# Patient Record
Sex: MALE | Race: WHITE | Employment: OTHER | URBAN - METROPOLITAN AREA
[De-identification: names, ages, dates, MRNs, and addresses within clinical notes are randomized per-mention and may not be internally consistent; named-entity substitution may affect disease eponyms.]

---

## 2017-06-12 PROBLEM — L03.115 CELLULITIS OF RIGHT LOWER EXTREMITY: Status: ACTIVE | Noted: 2017-06-12

## 2017-06-12 PROBLEM — I10 ESSENTIAL HYPERTENSION, BENIGN: Chronic | Status: ACTIVE | Noted: 2017-06-12

## 2017-06-12 PROBLEM — N18.30 CHRONIC KIDNEY DISEASE, STAGE III (MODERATE) (HCC): Status: ACTIVE | Noted: 2017-06-12

## 2017-06-12 PROBLEM — I87.2 VENOUS STASIS DERMATITIS OF BOTH LOWER EXTREMITIES: Chronic | Status: ACTIVE | Noted: 2017-06-12

## 2018-03-27 ENCOUNTER — OFFICE VISIT (OUTPATIENT)
Dept: CARDIOLOGY CLINIC | Age: 83
End: 2018-03-27
Payer: MEDICARE

## 2018-03-27 VITALS
BODY MASS INDEX: 29.77 KG/M2 | HEIGHT: 69 IN | WEIGHT: 201 LBS | SYSTOLIC BLOOD PRESSURE: 152 MMHG | DIASTOLIC BLOOD PRESSURE: 80 MMHG | HEART RATE: 48 BPM | RESPIRATION RATE: 16 BRPM

## 2018-03-27 DIAGNOSIS — R00.1 SINUS BRADYCARDIA: ICD-10-CM

## 2018-03-27 DIAGNOSIS — R06.02 SOB (SHORTNESS OF BREATH): Primary | ICD-10-CM

## 2018-03-27 PROCEDURE — 93000 ELECTROCARDIOGRAM COMPLETE: CPT | Performed by: INTERNAL MEDICINE

## 2018-03-27 PROCEDURE — 99205 OFFICE O/P NEW HI 60 MIN: CPT | Performed by: INTERNAL MEDICINE

## 2018-03-27 NOTE — PROGRESS NOTES
and oriented to person, place, and time. Skin: Skin is warm and dry. No rash noted. Not diaphoretic. No erythema. Psychiatric: Normal mood and affect. Behavior is normal.     EKG:  normal sinus rhythm, non conducted PAC's noted, there are no previous tracings available for comparison. ASSESSMENT AND PLAN:  Patient Active Problem List   Diagnosis    Essential hypertension, benign    Chronic kidney disease, stage III (moderate)    Venous stasis dermatitis of both lower extremities    Sinus bradycardia     Patient asymptomatic at good functioanl capacity for age, no symps referrable to bradcardia.   Non conducted PACs noted  Has prominent murmur  No pacer needed  Will echo       Jeet Gaviria M.D  [facility]

## 2018-03-29 ENCOUNTER — HOSPITAL ENCOUNTER (OUTPATIENT)
Dept: CARDIOLOGY | Age: 83
Discharge: HOME | End: 2018-03-29
Payer: MEDICARE

## 2018-03-29 DIAGNOSIS — R00.1 SINUS BRADYCARDIA: ICD-10-CM

## 2018-03-29 DIAGNOSIS — R06.02 SOB (SHORTNESS OF BREATH): ICD-10-CM

## 2018-03-29 LAB
LV EF: 55 %
LVEF MODALITY: NORMAL

## 2018-03-29 PROCEDURE — 93306 TTE W/DOPPLER COMPLETE: CPT

## 2018-04-02 ENCOUNTER — TELEPHONE (OUTPATIENT)
Dept: CARDIOLOGY CLINIC | Age: 83
End: 2018-04-02

## 2018-08-06 ENCOUNTER — HOSPITAL ENCOUNTER (INPATIENT)
Age: 83
LOS: 4 days | Discharge: HOME | DRG: 603 | End: 2018-08-10
Attending: INTERNAL MEDICINE | Admitting: INTERNAL MEDICINE
Payer: MEDICARE

## 2018-08-06 PROBLEM — A41.9 SEPSIS (HCC): Status: ACTIVE | Noted: 2018-08-06

## 2018-08-06 PROBLEM — L03.90 CELLULITIS: Status: ACTIVE | Noted: 2018-08-06

## 2018-08-06 LAB
ALBUMIN SERPL-MCNC: 3.5 G/DL (ref 3.5–5.2)
ALP BLD-CCNC: 160 U/L (ref 40–129)
ALT SERPL-CCNC: 28 U/L (ref 0–40)
ANION GAP SERPL CALCULATED.3IONS-SCNC: 14 MMOL/L (ref 7–16)
ANTISTREPTOLYSIN-O: 179 IU/ML (ref 0–200)
AST SERPL-CCNC: 36 U/L (ref 0–39)
BASOPHILS ABSOLUTE: 0.02 E9/L (ref 0–0.2)
BASOPHILS RELATIVE PERCENT: 0.2 % (ref 0–2)
BILIRUB SERPL-MCNC: 0.5 MG/DL (ref 0–1.2)
BUN BLDV-MCNC: 75 MG/DL (ref 8–23)
C-REACTIVE PROTEIN: 14.9 MG/DL (ref 0–0.4)
CALCIUM SERPL-MCNC: 8.9 MG/DL (ref 8.6–10.2)
CHLORIDE BLD-SCNC: 100 MMOL/L (ref 98–107)
CO2: 18 MMOL/L (ref 22–29)
CREAT SERPL-MCNC: 2.8 MG/DL (ref 0.7–1.2)
EOSINOPHILS ABSOLUTE: 0.23 E9/L (ref 0.05–0.5)
EOSINOPHILS RELATIVE PERCENT: 1.9 % (ref 0–6)
GFR AFRICAN AMERICAN: 26
GFR NON-AFRICAN AMERICAN: 21 ML/MIN/1.73
GLUCOSE BLD-MCNC: 132 MG/DL (ref 74–109)
HCT VFR BLD CALC: 34 % (ref 37–54)
HEMOGLOBIN: 11.4 G/DL (ref 12.5–16.5)
IMMATURE GRANULOCYTES #: 0.05 E9/L
IMMATURE GRANULOCYTES %: 0.4 % (ref 0–5)
LYMPHOCYTES ABSOLUTE: 0.98 E9/L (ref 1.5–4)
LYMPHOCYTES RELATIVE PERCENT: 8.2 % (ref 20–42)
MCH RBC QN AUTO: 30.6 PG (ref 26–35)
MCHC RBC AUTO-ENTMCNC: 33.5 % (ref 32–34.5)
MCV RBC AUTO: 91.4 FL (ref 80–99.9)
MONOCYTES ABSOLUTE: 1.26 E9/L (ref 0.1–0.95)
MONOCYTES RELATIVE PERCENT: 10.6 % (ref 2–12)
NEUTROPHILS ABSOLUTE: 9.36 E9/L (ref 1.8–7.3)
NEUTROPHILS RELATIVE PERCENT: 78.7 % (ref 43–80)
PDW BLD-RTO: 13.8 FL (ref 11.5–15)
PLATELET # BLD: 180 E9/L (ref 130–450)
PMV BLD AUTO: 11 FL (ref 7–12)
POTASSIUM SERPL-SCNC: 5 MMOL/L (ref 3.5–5)
RBC # BLD: 3.72 E12/L (ref 3.8–5.8)
SEDIMENTATION RATE, ERYTHROCYTE: 75 MM/HR (ref 0–15)
SODIUM BLD-SCNC: 132 MMOL/L (ref 132–146)
TOTAL PROTEIN: 6.9 G/DL (ref 6.4–8.3)
WBC # BLD: 11.9 E9/L (ref 4.5–11.5)

## 2018-08-06 PROCEDURE — 6360000002 HC RX W HCPCS: Performed by: INTERNAL MEDICINE

## 2018-08-06 PROCEDURE — 86140 C-REACTIVE PROTEIN: CPT

## 2018-08-06 PROCEDURE — 6370000000 HC RX 637 (ALT 250 FOR IP): Performed by: INTERNAL MEDICINE

## 2018-08-06 PROCEDURE — 86060 ANTISTREPTOLYSIN O TITER: CPT

## 2018-08-06 PROCEDURE — 2580000003 HC RX 258: Performed by: INTERNAL MEDICINE

## 2018-08-06 PROCEDURE — 85025 COMPLETE CBC W/AUTO DIFF WBC: CPT

## 2018-08-06 PROCEDURE — 2060000000 HC ICU INTERMEDIATE R&B

## 2018-08-06 PROCEDURE — 87040 BLOOD CULTURE FOR BACTERIA: CPT

## 2018-08-06 PROCEDURE — 85651 RBC SED RATE NONAUTOMATED: CPT

## 2018-08-06 PROCEDURE — 6360000002 HC RX W HCPCS: Performed by: SPECIALIST

## 2018-08-06 PROCEDURE — 36415 COLL VENOUS BLD VENIPUNCTURE: CPT

## 2018-08-06 PROCEDURE — 80053 COMPREHEN METABOLIC PANEL: CPT

## 2018-08-06 RX ORDER — HYDRALAZINE HYDROCHLORIDE 25 MG/1
25 TABLET, FILM COATED ORAL EVERY 8 HOURS SCHEDULED
Status: DISCONTINUED | OUTPATIENT
Start: 2018-08-06 | End: 2018-08-06 | Stop reason: SDUPTHER

## 2018-08-06 RX ORDER — HYDRALAZINE HYDROCHLORIDE 25 MG/1
25 TABLET, FILM COATED ORAL EVERY 8 HOURS SCHEDULED
Status: DISCONTINUED | OUTPATIENT
Start: 2018-08-06 | End: 2018-08-07

## 2018-08-06 RX ORDER — AMLODIPINE BESYLATE 2.5 MG/1
2.5 TABLET ORAL DAILY
Status: DISCONTINUED | OUTPATIENT
Start: 2018-08-06 | End: 2018-08-09

## 2018-08-06 RX ORDER — LISINOPRIL 20 MG/1
40 TABLET ORAL DAILY
Status: DISCONTINUED | OUTPATIENT
Start: 2018-08-07 | End: 2018-08-07

## 2018-08-06 RX ORDER — AMLODIPINE BESYLATE 2.5 MG/1
2.5 TABLET ORAL DAILY
Status: DISCONTINUED | OUTPATIENT
Start: 2018-08-06 | End: 2018-08-06 | Stop reason: SDUPTHER

## 2018-08-06 RX ORDER — CHOLECALCIFEROL (VITAMIN D3) 50 MCG
2000 TABLET ORAL DAILY
Status: DISCONTINUED | OUTPATIENT
Start: 2018-08-07 | End: 2018-08-10 | Stop reason: HOSPADM

## 2018-08-06 RX ORDER — ASPIRIN 81 MG/1
81 TABLET, CHEWABLE ORAL DAILY
Status: DISCONTINUED | OUTPATIENT
Start: 2018-08-06 | End: 2018-08-10 | Stop reason: HOSPADM

## 2018-08-06 RX ORDER — SODIUM CHLORIDE 0.9 % (FLUSH) 0.9 %
10 SYRINGE (ML) INJECTION PRN
Status: DISCONTINUED | OUTPATIENT
Start: 2018-08-06 | End: 2018-08-10 | Stop reason: HOSPADM

## 2018-08-06 RX ORDER — ONDANSETRON 2 MG/ML
4 INJECTION INTRAMUSCULAR; INTRAVENOUS EVERY 6 HOURS PRN
Status: DISCONTINUED | OUTPATIENT
Start: 2018-08-06 | End: 2018-08-10 | Stop reason: HOSPADM

## 2018-08-06 RX ORDER — HYDRALAZINE HYDROCHLORIDE 25 MG/1
25 TABLET, FILM COATED ORAL 3 TIMES DAILY
Status: ON HOLD | COMMUNITY
End: 2018-08-10 | Stop reason: HOSPADM

## 2018-08-06 RX ORDER — SODIUM CHLORIDE 0.9 % (FLUSH) 0.9 %
10 SYRINGE (ML) INJECTION EVERY 12 HOURS SCHEDULED
Status: DISCONTINUED | OUTPATIENT
Start: 2018-08-06 | End: 2018-08-10 | Stop reason: HOSPADM

## 2018-08-06 RX ADMIN — Medication 2 G: at 20:54

## 2018-08-06 RX ADMIN — Medication 10 ML: at 20:55

## 2018-08-06 RX ADMIN — ENOXAPARIN SODIUM 30 MG: 100 INJECTION SUBCUTANEOUS at 22:30

## 2018-08-06 RX ADMIN — HYDRALAZINE HYDROCHLORIDE 25 MG: 25 TABLET ORAL at 20:55

## 2018-08-06 ASSESSMENT — PAIN SCALES - GENERAL
PAINLEVEL_OUTOF10: 0

## 2018-08-07 ENCOUNTER — APPOINTMENT (OUTPATIENT)
Dept: ULTRASOUND IMAGING | Age: 83
DRG: 603 | End: 2018-08-07
Attending: INTERNAL MEDICINE
Payer: MEDICARE

## 2018-08-07 LAB
BACTERIA: NORMAL /HPF
BILIRUBIN URINE: NEGATIVE
BLOOD, URINE: NEGATIVE
CALCIUM IONIZED: 1.18 MMOL/L (ref 1.15–1.33)
CHLORIDE URINE RANDOM: 34 MMOL/L
CLARITY: CLEAR
COLOR: YELLOW
EKG ATRIAL RATE: 22 BPM
EKG Q-T INTERVAL: 502 MS
EKG QRS DURATION: 104 MS
EKG QTC CALCULATION (BAZETT): 414 MS
EKG R AXIS: -23 DEGREES
EKG T AXIS: 15 DEGREES
EKG VENTRICULAR RATE: 41 BPM
GLUCOSE URINE: NEGATIVE MG/DL
KETONES, URINE: NEGATIVE MG/DL
LEUKOCYTE ESTERASE, URINE: NEGATIVE
NITRITE, URINE: NEGATIVE
PH UA: 5.5 (ref 5–9)
POTASSIUM, UR: 20.5 MMOL/L
PROTEIN UA: 30 MG/DL
RBC UA: NORMAL /HPF (ref 0–2)
SODIUM URINE: 48 MMOL/L
SPECIFIC GRAVITY UA: 1.02 (ref 1–1.03)
UREA NITROGEN, UR: 859 MG/DL (ref 800–1666)
UROBILINOGEN, URINE: 0.2 E.U./DL
WBC UA: NORMAL /HPF (ref 0–5)

## 2018-08-07 PROCEDURE — 82044 UR ALBUMIN SEMIQUANTITATIVE: CPT

## 2018-08-07 PROCEDURE — 6360000002 HC RX W HCPCS: Performed by: SPECIALIST

## 2018-08-07 PROCEDURE — 6370000000 HC RX 637 (ALT 250 FOR IP): Performed by: INTERNAL MEDICINE

## 2018-08-07 PROCEDURE — 36415 COLL VENOUS BLD VENIPUNCTURE: CPT

## 2018-08-07 PROCEDURE — 99223 1ST HOSP IP/OBS HIGH 75: CPT | Performed by: INTERNAL MEDICINE

## 2018-08-07 PROCEDURE — 97161 PT EVAL LOW COMPLEX 20 MIN: CPT

## 2018-08-07 PROCEDURE — 6360000002 HC RX W HCPCS: Performed by: INTERNAL MEDICINE

## 2018-08-07 PROCEDURE — 2060000000 HC ICU INTERMEDIATE R&B

## 2018-08-07 PROCEDURE — 82570 ASSAY OF URINE CREATININE: CPT

## 2018-08-07 PROCEDURE — 87070 CULTURE OTHR SPECIMN AEROBIC: CPT

## 2018-08-07 PROCEDURE — 84300 ASSAY OF URINE SODIUM: CPT

## 2018-08-07 PROCEDURE — 2580000003 HC RX 258: Performed by: INTERNAL MEDICINE

## 2018-08-07 PROCEDURE — 97165 OT EVAL LOW COMPLEX 30 MIN: CPT

## 2018-08-07 PROCEDURE — 87205 SMEAR GRAM STAIN: CPT

## 2018-08-07 PROCEDURE — 84540 ASSAY OF URINE/UREA-N: CPT

## 2018-08-07 PROCEDURE — 82436 ASSAY OF URINE CHLORIDE: CPT

## 2018-08-07 PROCEDURE — 84133 ASSAY OF URINE POTASSIUM: CPT

## 2018-08-07 PROCEDURE — 93005 ELECTROCARDIOGRAM TRACING: CPT | Performed by: NURSE PRACTITIONER

## 2018-08-07 PROCEDURE — 82330 ASSAY OF CALCIUM: CPT

## 2018-08-07 PROCEDURE — APPSS60 APP SPLIT SHARED TIME 46-60 MINUTES: Performed by: NURSE PRACTITIONER

## 2018-08-07 PROCEDURE — 76770 US EXAM ABDO BACK WALL COMP: CPT

## 2018-08-07 PROCEDURE — 81001 URINALYSIS AUTO W/SCOPE: CPT

## 2018-08-07 PROCEDURE — 2580000003 HC RX 258: Performed by: SPECIALIST

## 2018-08-07 PROCEDURE — 6370000000 HC RX 637 (ALT 250 FOR IP): Performed by: SPECIALIST

## 2018-08-07 PROCEDURE — 51798 US URINE CAPACITY MEASURE: CPT

## 2018-08-07 RX ORDER — CEPHALEXIN 500 MG/1
1000 CAPSULE ORAL EVERY 12 HOURS SCHEDULED
Status: DISCONTINUED | OUTPATIENT
Start: 2018-08-08 | End: 2018-08-10 | Stop reason: HOSPADM

## 2018-08-07 RX ORDER — SODIUM CHLORIDE 9 MG/ML
INJECTION, SOLUTION INTRAVENOUS CONTINUOUS
Status: DISCONTINUED | OUTPATIENT
Start: 2018-08-07 | End: 2018-08-08

## 2018-08-07 RX ORDER — HYDRALAZINE HYDROCHLORIDE 50 MG/1
50 TABLET, FILM COATED ORAL EVERY 8 HOURS SCHEDULED
Status: DISCONTINUED | OUTPATIENT
Start: 2018-08-07 | End: 2018-08-08

## 2018-08-07 RX ORDER — CLINDAMYCIN HYDROCHLORIDE 150 MG/1
300 CAPSULE ORAL EVERY 8 HOURS SCHEDULED
Status: DISCONTINUED | OUTPATIENT
Start: 2018-08-07 | End: 2018-08-10 | Stop reason: HOSPADM

## 2018-08-07 RX ADMIN — ENOXAPARIN SODIUM 30 MG: 100 INJECTION SUBCUTANEOUS at 17:33

## 2018-08-07 RX ADMIN — SODIUM CHLORIDE: 9 INJECTION, SOLUTION INTRAVENOUS at 06:32

## 2018-08-07 RX ADMIN — Medication 10 ML: at 08:57

## 2018-08-07 RX ADMIN — HYDRALAZINE HYDROCHLORIDE 25 MG: 25 TABLET ORAL at 14:00

## 2018-08-07 RX ADMIN — CLINDAMYCIN HYDROCHLORIDE 300 MG: 150 CAPSULE ORAL at 17:33

## 2018-08-07 RX ADMIN — HYDRALAZINE HYDROCHLORIDE 25 MG: 25 TABLET ORAL at 06:12

## 2018-08-07 RX ADMIN — CEFAZOLIN 2 G: 10 INJECTION, POWDER, FOR SOLUTION INTRAVENOUS; PARENTERAL at 17:27

## 2018-08-07 RX ADMIN — HYDRALAZINE HYDROCHLORIDE 50 MG: 50 TABLET, FILM COATED ORAL at 21:05

## 2018-08-07 RX ADMIN — AMLODIPINE BESYLATE 2.5 MG: 2.5 TABLET ORAL at 08:56

## 2018-08-07 RX ADMIN — CLINDAMYCIN HYDROCHLORIDE 300 MG: 150 CAPSULE ORAL at 21:05

## 2018-08-07 RX ADMIN — ASPIRIN 81 MG 81 MG: 81 TABLET ORAL at 08:56

## 2018-08-07 RX ADMIN — Medication 2000 UNITS: at 06:12

## 2018-08-07 ASSESSMENT — PAIN SCALES - GENERAL
PAINLEVEL_OUTOF10: 0
PAINLEVEL_OUTOF10: 0

## 2018-08-07 NOTE — CONSULTS
unroofed and cultures were sent. Lines: peripheral      CBC+dif:  Recent Labs      08/06/18 2035   WBC  11.9*   HGB  11.4*   HCT  34.0*   MCV  91.4   PLT  180   NEUTROABS  9.36*     Lab Results   Component Value Date    CRP 14.9 (H) 08/06/2018    CRP 5.9 (H) 06/11/2017      No results found for: CRPHS  Lab Results   Component Value Date    SEDRATE 75 (H) 08/06/2018    SEDRATE 27 (H) 06/13/2017     Lab Results   Component Value Date    ALT 28 08/06/2018    AST 36 08/06/2018    ALKPHOS 160 (H) 08/06/2018    BILITOT 0.5 08/06/2018     Lab Results   Component Value Date     08/06/2018    K 5.0 08/06/2018     08/06/2018    CO2 18 08/06/2018    BUN 75 08/06/2018    CREATININE 2.8 08/06/2018    GFRAA 26 08/06/2018    LABGLOM 21 08/06/2018    GLUCOSE 132 08/06/2018    PROT 6.9 08/06/2018    LABALBU 3.5 08/06/2018    CALCIUM 8.9 08/06/2018    BILITOT 0.5 08/06/2018    ALKPHOS 160 08/06/2018    AST 36 08/06/2018    ALT 28 08/06/2018       Lab Results   Component Value Date    PROTIME 12.0 06/11/2017    INR 1.1 06/11/2017       No results found for: TSH    Lab Results   Component Value Date    COLORU Yellow 06/11/2017    PHUR 6.0 06/11/2017    WBCUA 1-3 06/11/2017    RBCUA NONE 06/11/2017    BACTERIA RARE 06/11/2017    CLARITYU Clear 06/11/2017    SPECGRAV 1.010 06/11/2017    LEUKOCYTESUR Negative 06/11/2017    UROBILINOGEN 0.2 06/11/2017    BILIRUBINUR Negative 06/11/2017    BLOODU Negative 06/11/2017    GLUCOSEU Negative 06/11/2017     Radiology:  Noted    Microbiology:  Pending  No results for input(s): BC in the last 72 hours. No results for input(s): ORG in the last 72 hours. No results for input(s): Jermaine Gonzalez in the last 72 hours. No results for input(s): STREPNEUMAGU in the last 72 hours. No results for input(s): LP1UAG in the last 72 hours. Recent Labs      08/06/18 2035   ASO  179     No results for input(s): CULTRESP in the last 72 hours.     Assessment:  · Cellulitis right leg  · Mild

## 2018-08-07 NOTE — H&P
consistent with extrasystoles. SKIN:  Reveals bright red erythema that is extending over his right  anterior lower leg from above his ankle to the knee. It is tender to touch  and it is hot to touch. Right leg has significantly increased swelling of  approximately 3+ swelling in his right leg with some serous drainage noted  which is minimal.  No evidence of any significant wound or ulcers of this  leg. His left lower extremity is much better, has these chronic stasis  changes and only 1+ swelling which is better than his usual leg swelling. HEENT:  Head:  Normocephalic, atraumatic. Eyes:  PERRLA. Extraocular  movements intact without nystagmus. Throat:  Oral and buccal mucosa moist  without oral ulcer or exudate. NECK:  No goiter, bruit, or lymphadenopathy. CHEST:  Symmetrical excursion with inspiration. BACK:  No CVA or spine tenderness. HEART:  Has a regular rate and rhythm with ectopy. No significant murmur,  rub, gallop, or JVD. LUNGS:  Clear to auscultation and percussion bilaterally. ABDOMEN:  Soft, nontender. Positive bowel sounds in all four quadrants. No hepatosplenomegaly. EXTREMITIES:  As above. NEUROLOGIC:  Cranial nerves II-XII grossly intact. He has grossly normal  sensory exam.  He has 5/5 strength throughout. His gait is normal.    ASSESSMENT/PLAN:  1. Cellulitis of the right lower extremity. This has developed over the  last four or five days. The patient does have a substrate of chronic leg  swelling with chronic stasis dermatitis and scaliness over both lower  extremities, and certainly would serve as a portal of entry. The right leg  swelling is much worse than the left and presumably due to the cellulitis. We will start him on some IV antibiotics per Infectious Disease, which has  been consulted. 2.  Hypertension. The patient has recently had some fluctuations in his  blood pressure medicines due to bradycardia which prompted the  discontinuation of his carvedilol. His hydrochlorothiazide was  discontinued due to a significant rise in his creatinine. He is on a  lowered dose of amlodipine due to chronic leg swelling. He is on low dose  of hydralazine as well as lisinopril 40 mg daily. These will be resumed,  and we will continue to watch his blood pressure closely. 3.  Chronic kidney disease stage IV. The patient's recent creatinine had  jumped up from his baseline around 1.8 to 2.0 to 2.4 with an increase in  his BUN. This prompted the discontinuation of his hydrochlorothiazide. We  will recheck labs and see how his kidney function is doing off of the  hydrochlorothiazide. 4.  Chronic venous insufficiency. The patient should wear his compression  hose chronically, but he just does not. His left leg has about 1+ leg  swelling with his usual chronic stasis changes, and this one leg actually  looks improved from his baseline. The right leg swelling is likely due to  his acute cellulitis. 5.  Dementia. The patient has a baseline of some mild-to-moderate  dementia. He is very pleasant with no behavioral changes, and the patient  and his family have declined medication in the past, and we will not start  any medications at this time per their request.    His admitting lab work including CBC, CMP, and blood cultures have not yet  been drawn, and are therefore presently pending at this time. Infectious  Disease has been consulted.         Maryann Olivas MD    D: 08/06/2018 18:27:51       T: 08/06/2018 18:31:41     [email or web address]  Job#: [id number]     Doc#: [id number]    CC:

## 2018-08-07 NOTE — CARE COORDINATION
CM met with pt & wife in room to discuss role, anticipated LOS & current plan of care. Wife states pt sent into hospital for IV ATB but he only received a dose last pm. Discussed pended cultures, ID consult & transition of care. Denies ever having home care but prefers to return home. Wife & pt state they get around without difficulty in their home, shop together & prepare meals. Decline any home needs at this time. She doesn't feel as if she could manage IV ATB at home should they be needed.

## 2018-08-07 NOTE — PROGRESS NOTES
states     Patient and or family understand(s) diagnosis, prognosis, and plan of care. -  Yes     PLAN  PT care will be provided in accordance with the objectives noted above. Whenever appropriate, clear delegation orders will be provided for nursing staff. Exercises and functional mobility practice will be used as well as appropriate assistive devices or modalities to obtain goals. Patient and family education will also be administered as needed. Frequency of treatments will be 2-3 x/week x  5 days.     Time in:  0958   Time out:  258 [place] number:  [id number]

## 2018-08-07 NOTE — PROGRESS NOTES
Subjective: The patient is awake and alert. No problems overnight. Denies chest pain, angina, and dyspnea. Denies abdominal pain. Tolerating diet. No nausea or vomiting. Started on cefazolin for his RLE cellulitis. His SBP is in 170's. His creat is increased to 2.8, BUN 75, K+ 5.0. Recent manipulations in BP meds included increase in lisinopril from 10-->40 mg daily, cessation of HCTZ due to elevated creatinine, cessation of carvedilol due to bradycardia, and addition of hydralazine. Hasn't tolerated larger doses of amlodipine due to increased leg swelling. (he has chronic leg swelling due to venous insuff). Despite stopping his carvedilol last week, his HR remains in 40's (sinus). Objective:    BP (!) 172/64   Pulse 50   Temp 97.2 °F (36.2 °C) (Oral)   Resp 18   Ht 5' 9\" (1.753 m)   Wt 202 lb 14.4 oz (92 kg)   SpO2 98%   BMI 29.96 kg/m²   Neck: No goiter, bruit, or LA  Heart:  Regular, thuan, no murmurs, gallops, or rubs. Lungs:  CTA bilaterally, no wheeze, rales or rhonchi  Abd: bowel sounds present, nontender, nondistended, no masses  Extrem:  No clubbing, cyanosis,, 2+ peripheral pulses, FROM. 1+ left lower leg swelling with chronic stasis changes (overall improved).  2-3+ right lower leg swelling with bright erythema and warmth    CBC:   Lab Results   Component Value Date    WBC 11.9 08/06/2018    RBC 3.72 08/06/2018    HGB 11.4 08/06/2018    HCT 34.0 08/06/2018    MCV 91.4 08/06/2018    MCH 30.6 08/06/2018    MCHC 33.5 08/06/2018    RDW 13.8 08/06/2018     08/06/2018    MPV 11.0 08/06/2018     CMP:    Lab Results   Component Value Date     08/06/2018    K 5.0 08/06/2018     08/06/2018    CO2 18 08/06/2018    BUN 75 08/06/2018    CREATININE 2.8 08/06/2018    GFRAA 26 08/06/2018    LABGLOM 21 08/06/2018    GLUCOSE 132 08/06/2018    PROT 6.9 08/06/2018    LABALBU 3.5 08/06/2018    CALCIUM 8.9 08/06/2018    BILITOT 0.5 08/06/2018    ALKPHOS 160 08/06/2018    AST 36

## 2018-08-07 NOTE — PLAN OF CARE
Problem: Falls - Risk of:  Goal: Will remain free from falls  Will remain free from falls   Outcome: Met This Shift      Problem: Skin Integrity/Risk  Goal: No skin breakdown during hospitalization  Outcome: Met This Shift      Problem: Pain:  Goal: Pain level will decrease  Pain level will decrease   Outcome: Met This Shift      Problem: Mobility - Impaired:  Goal: Mobility will improve  Mobility will improve   Outcome: Ongoing

## 2018-08-07 NOTE — CONSULTS
due to patient's advanced age        REVIEW OF SYSTEMS:     · Constitutional: Denies fevers,  night sweats, and fatigue + for chills   · HEENT: Denies headaches, nose bleeds, and blurred vision,oral pain, abscess or lesion. · Musculoskeletal: Denies , pain to BLE with ambulation and + for  edema to BLE chronic in nature. + for mechanical fall   · Neurological: Denies dizziness and lightheadedness, numbness and tingling  · Cardiovascular: Denies chest pain, palpitations, and feelings of heart racing. · Respiratory: Denies orthopnea and PND  · Gastrointestinal: Denies heartburn, nausea/vomiting, diarrhea and constipation, black/bloody, and tarry stools. · Genitourinary: Denies dysuria and hematuria  · Hematologic: Denies excessive bruising or bleeding  · Lymphatic: Denies lumps and bumps to neck, axilla, breast, and groin  · Endocrine: Denies excessive thirst. Denies intolerance to hot and cold. · Psychiatric: Denies anxiety and depression. PHYSICAL EXAM:   BP (!) 172/64   Pulse 50   Temp 97.2 °F (36.2 °C) (Oral)   Resp 18   Ht 5' 9\" (1.753 m)   Wt 202 lb 14.4 oz (92 kg)   SpO2 98%   BMI 29.96 kg/m²   CONST:  Well developed,  male, hard of hearing, examined at bedside with wife present,  well nourished who appears stated age. Awake, alert, cooperative, no apparent distress  HEENT:   Head- Normocephalic, atraumatic   Eyes- Conjunctivae pink, anicteric  Throat- Oral mucosa pink and moist  Neck-  No stridor, trachea midline, no jugular venous distention. No adenopathy   CHEST: Chest symmetrical and non-tender to palpation. No accessory muscle use or intercostal retractions  RESPIRATORY: Lung sounds - clear throughout fields   CARDIOVASCULAR:     No carotid bruit  Heart Inspection- shows no noted pulsations  Heart Palpation- no heaves or thrills; PMI is non-displaced   Heart Ausculation- Regular rate and rhythm, 1/6 systolic murmur.  No s3, s4 or rub   PV: + 2  right lower extremity edema trace

## 2018-08-08 LAB
ALBUMIN SERPL-MCNC: 2.8 G/DL (ref 3.5–5.2)
ALP BLD-CCNC: 83 U/L (ref 40–129)
ALT SERPL-CCNC: 13 U/L (ref 0–40)
ANION GAP SERPL CALCULATED.3IONS-SCNC: 12 MMOL/L (ref 7–16)
AST SERPL-CCNC: 17 U/L (ref 0–39)
BASOPHILS ABSOLUTE: 0.02 E9/L (ref 0–0.2)
BASOPHILS RELATIVE PERCENT: 0.3 % (ref 0–2)
BILIRUB SERPL-MCNC: 0.4 MG/DL (ref 0–1.2)
BUN BLDV-MCNC: 62 MG/DL (ref 8–23)
CALCIUM IONIZED: 0.95 MMOL/L (ref 1.15–1.33)
CALCIUM SERPL-MCNC: 8.2 MG/DL (ref 8.6–10.2)
CHLORIDE BLD-SCNC: 106 MMOL/L (ref 98–107)
CO2: 15 MMOL/L (ref 22–29)
CREAT SERPL-MCNC: 2.3 MG/DL (ref 0.7–1.2)
CREATININE URINE: 76 MG/DL (ref 40–278)
EOSINOPHILS ABSOLUTE: 0.31 E9/L (ref 0.05–0.5)
EOSINOPHILS RELATIVE PERCENT: 3.9 % (ref 0–6)
FERRITIN: 492 NG/ML
FOLATE: >20 NG/ML (ref 4.8–24.2)
GFR AFRICAN AMERICAN: 32
GFR NON-AFRICAN AMERICAN: 27 ML/MIN/1.73
GLUCOSE BLD-MCNC: 124 MG/DL (ref 74–109)
GRAM STAIN ORDERABLE: NORMAL
HCT VFR BLD CALC: 31 % (ref 37–54)
HEMOGLOBIN: 10.3 G/DL (ref 12.5–16.5)
IMMATURE GRANULOCYTES #: 0.14 E9/L
IMMATURE GRANULOCYTES %: 1.8 % (ref 0–5)
IRON SATURATION: 20 % (ref 20–55)
IRON: 33 MCG/DL (ref 59–158)
LYMPHOCYTES ABSOLUTE: 1.06 E9/L (ref 1.5–4)
LYMPHOCYTES RELATIVE PERCENT: 13.4 % (ref 20–42)
MAGNESIUM: 2.4 MG/DL (ref 1.6–2.6)
MCH RBC QN AUTO: 30.2 PG (ref 26–35)
MCHC RBC AUTO-ENTMCNC: 33.2 % (ref 32–34.5)
MCV RBC AUTO: 90.9 FL (ref 80–99.9)
MICROALBUMIN UR-MCNC: 366 MG/L
MICROALBUMIN/CREAT UR-RTO: 481.6 (ref 0–30)
MONOCYTES ABSOLUTE: 1.11 E9/L (ref 0.1–0.95)
MONOCYTES RELATIVE PERCENT: 14 % (ref 2–12)
NEUTROPHILS ABSOLUTE: 5.3 E9/L (ref 1.8–7.3)
NEUTROPHILS RELATIVE PERCENT: 66.6 % (ref 43–80)
PARATHYROID HORMONE INTACT: 61 PG/ML (ref 15–65)
PDW BLD-RTO: 13.9 FL (ref 11.5–15)
PHOSPHORUS: 3.3 MG/DL (ref 2.5–4.5)
PLATELET # BLD: 188 E9/L (ref 130–450)
PMV BLD AUTO: 10.7 FL (ref 7–12)
POTASSIUM SERPL-SCNC: 4.4 MMOL/L (ref 3.5–5)
RBC # BLD: 3.41 E12/L (ref 3.8–5.8)
SODIUM BLD-SCNC: 133 MMOL/L (ref 132–146)
TOTAL IRON BINDING CAPACITY: 163 MCG/DL (ref 250–450)
TOTAL PROTEIN: 6 G/DL (ref 6.4–8.3)
VITAMIN B-12: 814 PG/ML (ref 211–946)
VITAMIN D 25-HYDROXY: 37 NG/ML (ref 30–100)
WBC # BLD: 7.9 E9/L (ref 4.5–11.5)

## 2018-08-08 PROCEDURE — 80053 COMPREHEN METABOLIC PANEL: CPT

## 2018-08-08 PROCEDURE — 83970 ASSAY OF PARATHORMONE: CPT

## 2018-08-08 PROCEDURE — 84100 ASSAY OF PHOSPHORUS: CPT

## 2018-08-08 PROCEDURE — 6360000002 HC RX W HCPCS: Performed by: INTERNAL MEDICINE

## 2018-08-08 PROCEDURE — 82306 VITAMIN D 25 HYDROXY: CPT

## 2018-08-08 PROCEDURE — 6370000000 HC RX 637 (ALT 250 FOR IP): Performed by: INTERNAL MEDICINE

## 2018-08-08 PROCEDURE — 2500000003 HC RX 250 WO HCPCS: Performed by: INTERNAL MEDICINE

## 2018-08-08 PROCEDURE — 82607 VITAMIN B-12: CPT

## 2018-08-08 PROCEDURE — 6370000000 HC RX 637 (ALT 250 FOR IP): Performed by: SPECIALIST

## 2018-08-08 PROCEDURE — 83550 IRON BINDING TEST: CPT

## 2018-08-08 PROCEDURE — 2580000003 HC RX 258: Performed by: INTERNAL MEDICINE

## 2018-08-08 PROCEDURE — 82330 ASSAY OF CALCIUM: CPT

## 2018-08-08 PROCEDURE — 83735 ASSAY OF MAGNESIUM: CPT

## 2018-08-08 PROCEDURE — 83540 ASSAY OF IRON: CPT

## 2018-08-08 PROCEDURE — 85025 COMPLETE CBC W/AUTO DIFF WBC: CPT

## 2018-08-08 PROCEDURE — 36415 COLL VENOUS BLD VENIPUNCTURE: CPT

## 2018-08-08 PROCEDURE — 2060000000 HC ICU INTERMEDIATE R&B

## 2018-08-08 PROCEDURE — 82728 ASSAY OF FERRITIN: CPT

## 2018-08-08 PROCEDURE — 82746 ASSAY OF FOLIC ACID SERUM: CPT

## 2018-08-08 RX ADMIN — CALCIUM GLUCONATE 2 G: 98 INJECTION, SOLUTION INTRAVENOUS at 18:11

## 2018-08-08 RX ADMIN — Medication 2000 UNITS: at 06:17

## 2018-08-08 RX ADMIN — SODIUM BICARBONATE: 84 INJECTION, SOLUTION INTRAVENOUS at 18:11

## 2018-08-08 RX ADMIN — HYDRALAZINE HYDROCHLORIDE 50 MG: 50 TABLET, FILM COATED ORAL at 13:50

## 2018-08-08 RX ADMIN — CLINDAMYCIN HYDROCHLORIDE 300 MG: 150 CAPSULE ORAL at 20:41

## 2018-08-08 RX ADMIN — ENOXAPARIN SODIUM 30 MG: 100 INJECTION SUBCUTANEOUS at 18:52

## 2018-08-08 RX ADMIN — SODIUM CHLORIDE: 9 INJECTION, SOLUTION INTRAVENOUS at 12:35

## 2018-08-08 RX ADMIN — SODIUM CHLORIDE: 9 INJECTION, SOLUTION INTRAVENOUS at 03:26

## 2018-08-08 RX ADMIN — ASPIRIN 81 MG 81 MG: 81 TABLET ORAL at 08:50

## 2018-08-08 RX ADMIN — HYDRALAZINE HYDROCHLORIDE 75 MG: 50 TABLET ORAL at 20:41

## 2018-08-08 RX ADMIN — CEPHALEXIN 1000 MG: 500 CAPSULE ORAL at 20:41

## 2018-08-08 RX ADMIN — CLINDAMYCIN HYDROCHLORIDE 300 MG: 150 CAPSULE ORAL at 06:16

## 2018-08-08 RX ADMIN — AMLODIPINE BESYLATE 2.5 MG: 2.5 TABLET ORAL at 08:50

## 2018-08-08 RX ADMIN — HYDRALAZINE HYDROCHLORIDE 50 MG: 50 TABLET, FILM COATED ORAL at 06:16

## 2018-08-08 RX ADMIN — CLINDAMYCIN HYDROCHLORIDE 300 MG: 150 CAPSULE ORAL at 13:50

## 2018-08-08 RX ADMIN — CEPHALEXIN 1000 MG: 500 CAPSULE ORAL at 08:50

## 2018-08-08 ASSESSMENT — PAIN SCALES - GENERAL
PAINLEVEL_OUTOF10: 0
PAINLEVEL_OUTOF10: 0

## 2018-08-08 NOTE — PROGRESS NOTES
Nephrology  Note  Patient's Name: Robert Mitchell  5:11 PM  8/8/2018    Nephrologist: Jeanna Vázquez    Reason for Consult:  Stage II IRWIN  Requesting Physician:  Savage Kam MD    Chief Complaint:  Leg Pain    History Obtained From:  patient, spouse/SO and past medical records    History of Present Ilness:    Robert Mitchell is a 80 y.o. male with prior history of CKD G3B with a baseline serum cr 1.6mg/dl with a e-GFR=-41ml/min. He states he has been having more problems walking and had leg weakness. He saw his primary care doctor on 7/25 and the HCTZ was stopped and hydralazine was initiated. He had edema and saw primary doctor 8/2 for edema and he had an US (-) for DVT. He also had a fall and the EMS had to get him up. He developed erythema of the RLE and went back to the primary care MD and was sent to the ER. Past Medical History:   Diagnosis Date    Arthritis     Hypertension        Past Surgical History:   Procedure Laterality Date    HERNIA REPAIR      JOINT REPLACEMENT      JOINT REPLACEMENT      right knee       No family history on file. reports that he has never smoked. He has never used smokeless tobacco. He reports that he drinks about 1.8 oz of alcohol per week . He reports that he does not use drugs. Allergies:  Patient has no known allergies.     Current Medications:      0.9 % sodium chloride infusion Continuous   hydrALAZINE (APRESOLINE) tablet 50 mg 3 times per day   cephALEXin (KEFLEX) capsule 1,000 mg 2 times per day   clindamycin (CLEOCIN) capsule 300 mg 3 times per day   aspirin chewable tablet 81 mg Daily   amLODIPine (NORVASC) tablet 2.5 mg Daily   vitamin D tablet 2,000 Units Daily   sodium chloride flush 0.9 % injection 10 mL 2 times per day   sodium chloride flush 0.9 % injection 10 mL PRN   magnesium hydroxide (MILK OF MAGNESIA) 400 MG/5ML suspension 30 mL Daily PRN   ondansetron (ZOFRAN) injection 4 mg Q6H PRN   enoxaparin (LOVENOX) injection 30 mg Daily       Review of HGB 10.3 08/08/2018    HCT 31.0 08/08/2018     CMP:    Lab Results   Component Value Date     08/08/2018    K 4.4 08/08/2018     08/08/2018    CO2 15 08/08/2018    BUN 62 08/08/2018    CREATININE 2.3 08/08/2018    GFRAA 32 08/08/2018    LABGLOM 27 08/08/2018    GLUCOSE 124 08/08/2018    PROT 6.0 08/08/2018    LABALBU 2.8 08/08/2018    CALCIUM 8.2 08/08/2018    BILITOT 0.4 08/08/2018    ALKPHOS 83 08/08/2018    AST 17 08/08/2018    ALT 13 08/08/2018     BMP:    Lab Results   Component Value Date     08/08/2018    K 4.4 08/08/2018     08/08/2018    CO2 15 08/08/2018    BUN 62 08/08/2018    LABALBU 2.8 08/08/2018    CREATININE 2.3 08/08/2018    CALCIUM 8.2 08/08/2018    GFRAA 32 08/08/2018    LABGLOM 27 08/08/2018    GLUCOSE 124 08/08/2018     BUN/Creatinine:    Lab Results   Component Value Date    BUN 62 08/08/2018    CREATININE 2.3 08/08/2018     Hepatic Function Panel:    Lab Results   Component Value Date    ALKPHOS 83 08/08/2018    ALT 13 08/08/2018    AST 17 08/08/2018    PROT 6.0 08/08/2018    BILITOT 0.4 08/08/2018    LABALBU 2.8 08/08/2018     Albumin:    Lab Results   Component Value Date    LABALBU 2.8 08/08/2018     Calcium:    Lab Results   Component Value Date    CALCIUM 8.2 08/08/2018     Ionized Calcium:  No results found for: IONCA  Magnesium:    Lab Results   Component Value Date    MG 2.4 08/08/2018     Phosphorus:    Lab Results   Component Value Date    PHOS 3.3 08/08/2018     LDH:  No results found for: LDH  Uric Acid:  No results found for: LABURIC, URICACID  PT/INR:    Lab Results   Component Value Date    PROTIME 12.0 06/11/2017    INR 1.1 06/11/2017     PTT:  No results found for: APTT, PTT[APTT}  Troponin:    Lab Results   Component Value Date    TROPONINI <0.01 06/11/2017     U/A:    Lab Results   Component Value Date    COLORU Yellow 08/07/2018    PROTEINU 30 08/07/2018    PHUR 5.5 08/07/2018    WBCUA NONE 08/07/2018    RBCUA NONE 08/07/2018    BACTERIA NONE 2018    CLARITYU Clear 2018    SPECGRAV 1.020 2018    LEUKOCYTESUR Negative 2018    UROBILINOGEN 0.2 2018    BILIRUBINUR Negative 2018    BLOODU Negative 2018    GLUCOSEU Negative 2018     ABG:  No results found for: PH, PCO2, PO2, HCO3, BE, THGB, TCO2, O2SAT  HgBA1c:  No results found for: LABA1C  Microalbumen/Creatinine ratio:  No components found for: RUCREAT  FLP:  No results found for: TRIG, HDL, LDLCALC, LDLDIRECT, LABVLDL  VITAMIN B12: No components found for: B12  FOLATE:    Lab Results   Component Value Date    FOLATE >20.0 2018     Iron Saturation:  No components found for: PERCENTFE  FERRITIN:    Lab Results   Component Value Date    FERRITIN 492 2018     AMYLASE:  No results found for: AMYLASE  LIPASE:    Lab Results   Component Value Date    LIPASE 17 2017     24 Hour Urine for Protein:  No components found for: RAWUPRO, UHRS3, BVXN45MO, UTV3  24 Hour Urine for Creatinine Clearance:  No components found for: CREAT4, UHRS10, UTV10  PSA: No results found for: PSA     Imagin2018 4:15 PM       EXAM: US RETROPERITONEAL COMPLETE       NUMBER OF IMAGES:  41       INDICATION: Acute renal failure       Technique: Multiplanar grayscale ultrasonographic images were obtained   of the kidneys by the ultrasound technologist on 2018 4:15 PM.    Adjacent structures are also visualized. Selected static images were   presented to the radiologist for review.       Comparison: No prior studies available for comparison.       Findings: The kidneys demonstrate normal smooth contour. There is   slightly increased echogenicity of the renal parenchyma bilaterally   with minimal bilateral renal cortical thinning. The right kidney   measures approximately 9.5 cm x 4.7 cm x 4.4 cm and the left kidney   measures approximately 9.7 cm x 5.1 cm x 4.7 cm. No hydronephrosis,   perinephric collections, renal calculi or solid renal masses are   demonstrated.  The visualized urinary bladder is unremarkable.           Impression   1. No evidence of hydronephrosis, perinephric collections, renal   calculi or solid renal masses. 2. Slightly increased echogenicity of the renal parenchyma bilaterally   with minimal bilateral renal cortical thinning. Findings can be seen   in the setting of medical renal disease. Recommend clinical   correlation.          Assessment  1-Stage II IRWIN secondary to presumed combined effects of decreased effective renal perfusion due to the ACEI in the setting of the sepsis and cellulitis with  Inflammatory cytokines-renal us no hydro-urine electrolytes show FeNa 1.09% and UA with 30mg/dl protein with an   in the setting of the acute inflammatory-cr improving with the IVF-continue IVF    2-CKD G3B with a baseline serum cr 1.6mg/dl with a e-GFR=-41ml/min presumed sec to microvascular disease due to HTN and glomerular senescence of aging    3-Non Anion Gap Metabolic Acidosis in the setting of the IRWIN-HCO3 trending down in the setting of a Cl(-) expansion-add HCO3 to IVF    4-Anemia in CKD -HgB >11 no LYNDA indicated-check Ferritin 492 and iron sat 20%, B12 814, folate  >20    5-HTN with CKD I-IV with  Bradycardia-cardiology following-avoid (-) chronotropic meds-BP above goal <130/80- increase the hydralazine to 75mg tid and follow on the increased hydralazine and amlodipine    6- Cellulitis of the RLE-on cefazolin and Clinda-BC NG    7- hypocalcemia with Hypoalbuminemia in the setting of the Sec HPTH-PTH in goal 61 and Vit D 37-will treat ionized Ca++ with 2gr IV Ca++      Thank you  for allowing me to participate in care of [address] [place] [address] Dustin  5:11 PM  8/8/2018

## 2018-08-08 NOTE — PROGRESS NOTES
(L) 08/08/2018    MCV 90.9 08/08/2018     08/08/2018     Lab Results   Component Value Date    NEUTROABS 5.30 08/08/2018    NEUTROABS 9.36 (H) 08/06/2018    NEUTROABS 5.63 06/14/2017     Lab Results   Component Value Date    CRP 14.9 (H) 08/06/2018    CRP 5.9 (H) 06/11/2017     No results found for: CRPHS  Lab Results   Component Value Date    SEDRATE 75 (H) 08/06/2018    SEDRATE 27 (H) 06/13/2017     Lab Results   Component Value Date    ALT 13 08/08/2018    AST 17 08/08/2018    ALKPHOS 83 08/08/2018    BILITOT 0.4 08/08/2018     Lab Results   Component Value Date     08/08/2018    K 4.4 08/08/2018     08/08/2018    CO2 15 08/08/2018    BUN 62 08/08/2018    CREATININE 2.3 08/08/2018    CREATININE 2.8 08/06/2018    CREATININE 1.6 06/14/2017    GFRAA 32 08/08/2018    LABGLOM 27 08/08/2018    GLUCOSE 124 08/08/2018    PROT 6.0 08/08/2018    LABALBU 2.8 08/08/2018    CALCIUM 8.2 08/08/2018    BILITOT 0.4 08/08/2018    ALKPHOS 83 08/08/2018    AST 17 08/08/2018    ALT 13 08/08/2018     Radiology:      Microbiology:   Wound culture right foot negative so far  Blood cultures negative so far    ASSESSMENT:  · Cellulitis right lower extremity  · Leukocytosis secondary to cellulitis, improving    PLAN:  · Continue Cephalexin and Clindamycin  · Check final cultures  · Monitor labs    Fernando Lehman  2:49 PM  8/8/2018

## 2018-08-08 NOTE — PROGRESS NOTES
Subjective: The patient is resting quietly. Appreciate input of all consultants. On cefazolin for his cellulitis. No fever. WBC improving. No CP palp or SOB. Usual leg swelling. Sinus bradycardia persists off carvedilol-- HR 40's This appears to be Asx. Creatinine improved a bit. Off HCTZ and lisinopril due to IRWIN. Off carvedilol due to bradycardia. Renal on case. Hydralazine increased to 50 mgTID. -180. No problems overnight. Denies chest pain, angina, and dyspnea. Denies abdominal pain. Tolerating diet. No nausea or vomiting. Objective:    BP (!) 166/58 Comment: manual  Pulse 59   Temp 98 °F (36.7 °C) (Oral)   Resp 16   Ht 5' 9\" (1.753 m)   Wt 206 lb 9.6 oz (93.7 kg)   SpO2 98%   BMI 30.51 kg/m²   Neck: No goiter, bruit, or LA  Heart:  Regular, thuan, no murmurs, gallops, or rubs. Lungs:  CTA bilaterally, no wheeze, rales or rhonchi  Abd: bowel sounds present, nontender, nondistended, no masses  Extrem:  No clubbing, cyanosis,, 2+ peripheral pulses, FROM. 1+ left lower leg swelling with chronic stasis changes. RLE with 2+ lower leg swelling and + warm, tender erythema.     CBC:   Lab Results   Component Value Date    WBC 7.9 08/08/2018    RBC 3.41 08/08/2018    HGB 10.3 08/08/2018    HCT 31.0 08/08/2018    MCV 90.9 08/08/2018    MCH 30.2 08/08/2018    MCHC 33.2 08/08/2018    RDW 13.9 08/08/2018     08/08/2018    MPV 10.7 08/08/2018     CMP:    Lab Results   Component Value Date     08/08/2018    K 4.4 08/08/2018     08/08/2018    CO2 15 08/08/2018    BUN 62 08/08/2018    CREATININE 2.3 08/08/2018    GFRAA 32 08/08/2018    LABGLOM 27 08/08/2018    GLUCOSE 124 08/08/2018    PROT 6.0 08/08/2018    LABALBU 2.8 08/08/2018    CALCIUM 8.2 08/08/2018    BILITOT 0.4 08/08/2018    ALKPHOS 83 08/08/2018    AST 17 08/08/2018    ALT 13 08/08/2018          Current Facility-Administered Medications:     0.9 % sodium chloride infusion, , Intravenous, Continuous, David Chaves MD,

## 2018-08-08 NOTE — PROGRESS NOTES
P Quality Flow/Interdisciplinary Rounds Progress Note        Quality Flow Rounds held on August 8, 2018    Disciplines Attending:  Bedside Nurse, ,  and Nursing Unit [address] [place], [place] [address]paulie Gomez was admitted on 8/6/2018  5:02 PM    Anticipated Discharge Date:  Expected Discharge Date: 08/08/18    Disposition:    Claudio Score:  Claudio Scale Score: 20    Readmission Risk              Risk of Unplanned Readmission:        16             Discussed patient goal for the day, patient clinical progression, and barriers to discharge.   The following Goal(s) of the Day/Commitment(s) have been identified:  abx      Melvi Marking  August 8, 2018

## 2018-08-08 NOTE — PROGRESS NOTES
Lab Results   Component Value Date    HGB 11.4 08/06/2018    HCT 34.0 08/06/2018     CMP:    Lab Results   Component Value Date     08/06/2018    K 5.0 08/06/2018     08/06/2018    CO2 18 08/06/2018    BUN 75 08/06/2018    CREATININE 2.8 08/06/2018    GFRAA 26 08/06/2018    LABGLOM 21 08/06/2018    GLUCOSE 132 08/06/2018    PROT 6.9 08/06/2018    LABALBU 3.5 08/06/2018    CALCIUM 8.9 08/06/2018    BILITOT 0.5 08/06/2018    ALKPHOS 160 08/06/2018    AST 36 08/06/2018    ALT 28 08/06/2018     BMP:    Lab Results   Component Value Date     08/06/2018    K 5.0 08/06/2018     08/06/2018    CO2 18 08/06/2018    BUN 75 08/06/2018    LABALBU 3.5 08/06/2018    CREATININE 2.8 08/06/2018    CALCIUM 8.9 08/06/2018    GFRAA 26 08/06/2018    LABGLOM 21 08/06/2018    GLUCOSE 132 08/06/2018     BUN/Creatinine:    Lab Results   Component Value Date    BUN 75 08/06/2018    CREATININE 2.8 08/06/2018     Hepatic Function Panel:    Lab Results   Component Value Date    ALKPHOS 160 08/06/2018    ALT 28 08/06/2018    AST 36 08/06/2018    PROT 6.9 08/06/2018    BILITOT 0.5 08/06/2018    LABALBU 3.5 08/06/2018     Albumin:    Lab Results   Component Value Date    LABALBU 3.5 08/06/2018     Calcium:    Lab Results   Component Value Date    CALCIUM 8.9 08/06/2018     Ionized Calcium:  No results found for: IONCA  Magnesium:  No results found for: MG  Phosphorus:  No results found for: PHOS  LDH:  No results found for: LDH  Uric Acid:  No results found for: LABURIC, URICACID  PT/INR:    Lab Results   Component Value Date    PROTIME 12.0 06/11/2017    INR 1.1 06/11/2017     PTT:  No results found for: APTT, PTT[APTT}  Troponin:    Lab Results   Component Value Date    TROPONINI <0.01 06/11/2017     U/A:    Lab Results   Component Value Date    COLORU Yellow 06/11/2017    PROTEINU TRACE 06/11/2017    PHUR 6.0 06/11/2017    WBCUA 1-3 06/11/2017    RBCUA NONE 06/11/2017    BACTERIA RARE 06/11/2017    CLARITYU Clear 2017    SPECGRAV 1.010 2017    LEUKOCYTESUR Negative 2017    UROBILINOGEN 0.2 2017    BILIRUBINUR Negative 2017    BLOODU Negative 2017    GLUCOSEU Negative 2017     ABG:  No results found for: PH, PCO2, PO2, HCO3, BE, THGB, TCO2, O2SAT  HgBA1c:  No results found for: LABA1C  Microalbumen/Creatinine ratio:  No components found for: RUCREAT  FLP:  No results found for: TRIG, HDL, LDLCALC, LDLDIRECT, LABVLDL  VITAMIN B12: No components found for: B12  FOLATE:  No results found for: FOLATE  Iron Saturation:  No components found for: PERCENTFE  FERRITIN:  No results found for: FERRITIN  AMYLASE:  No results found for: AMYLASE  LIPASE:    Lab Results   Component Value Date    LIPASE 17 2017     24 Hour Urine for Protein:  No components found for: RAWUPRO, UHRS3, KMGX47HC, UTV3  24 Hour Urine for Creatinine Clearance:  No components found for: CREAT4, UHRS10, UTV10  PSA: No results found for: PSA     Imagin2018 4:15 PM       EXAM: US RETROPERITONEAL COMPLETE       NUMBER OF IMAGES:  41       INDICATION: Acute renal failure       Technique: Multiplanar grayscale ultrasonographic images were obtained   of the kidneys by the ultrasound technologist on 2018 4:15 PM.    Adjacent structures are also visualized. Selected static images were   presented to the radiologist for review.       Comparison: No prior studies available for comparison.       Findings: The kidneys demonstrate normal smooth contour. There is   slightly increased echogenicity of the renal parenchyma bilaterally   with minimal bilateral renal cortical thinning. The right kidney   measures approximately 9.5 cm x 4.7 cm x 4.4 cm and the left kidney   measures approximately 9.7 cm x 5.1 cm x 4.7 cm. No hydronephrosis,   perinephric collections, renal calculi or solid renal masses are   demonstrated. The visualized urinary bladder is unremarkable.           Impression   1.  No evidence of hydronephrosis, perinephric collections, renal   calculi or solid renal masses. 2. Slightly increased echogenicity of the renal parenchyma bilaterally   with minimal bilateral renal cortical thinning. Findings can be seen   in the setting of medical renal disease. Recommend clinical   correlation.          Assessment  1-Stage II IRWIN secondary to presumed combined effects of decreased effective renal perfusion due to the ACEI in the setting of the sepsis and cellulitis with  Inflammatory cytokines-renal us no hydro-await urine electrolytes and UA-continue IVF    2-CKD G3B with a baseline serum cr 1.6mg/dl with a e-GFR=-41ml/min presumed sec to microvascular disease due to HTN and glomerular senescence of aging    3-Non Anion Gap Metabolic Acidosis in the setting of the IRWIN    4-Anemia in CKD -HgB >11 no LYNDA indicated-check Fe++, B12, folate    5-HTN with CKD I-IV with  Bradycardia-cardiology following-avoid (-) chronotropic meds-BP above goal <130/80-follow on the hydralazine and amlodippine    6- Cellulitis of the RLE-on cefazolin and Clinda      Thank you  for allowing me to participate in care of Baldemar Oswald  9:08 PM  8/7/2018

## 2018-08-09 LAB
ALBUMIN SERPL-MCNC: 2.7 G/DL (ref 3.5–5.2)
ALP BLD-CCNC: 85 U/L (ref 40–129)
ALT SERPL-CCNC: 11 U/L (ref 0–40)
ANION GAP SERPL CALCULATED.3IONS-SCNC: 11 MMOL/L (ref 7–16)
AST SERPL-CCNC: 18 U/L (ref 0–39)
BASOPHILS ABSOLUTE: 0.01 E9/L (ref 0–0.2)
BASOPHILS RELATIVE PERCENT: 0.1 % (ref 0–2)
BILIRUB SERPL-MCNC: 0.5 MG/DL (ref 0–1.2)
BUN BLDV-MCNC: 52 MG/DL (ref 8–23)
CALCIUM IONIZED: 1.3 MMOL/L (ref 1.15–1.33)
CALCIUM SERPL-MCNC: 8.4 MG/DL (ref 8.6–10.2)
CHLORIDE BLD-SCNC: 108 MMOL/L (ref 98–107)
CO2: 16 MMOL/L (ref 22–29)
CREAT SERPL-MCNC: 1.8 MG/DL (ref 0.7–1.2)
EOSINOPHILS ABSOLUTE: 0.36 E9/L (ref 0.05–0.5)
EOSINOPHILS RELATIVE PERCENT: 4.4 % (ref 0–6)
GFR AFRICAN AMERICAN: 43
GFR NON-AFRICAN AMERICAN: 36 ML/MIN/1.73
GLUCOSE BLD-MCNC: 151 MG/DL (ref 74–109)
HCT VFR BLD CALC: 30.2 % (ref 37–54)
HEMOGLOBIN: 10.3 G/DL (ref 12.5–16.5)
IMMATURE GRANULOCYTES #: 0.17 E9/L
IMMATURE GRANULOCYTES %: 2.1 % (ref 0–5)
LYMPHOCYTES ABSOLUTE: 0.85 E9/L (ref 1.5–4)
LYMPHOCYTES RELATIVE PERCENT: 10.4 % (ref 20–42)
MAGNESIUM: 2.2 MG/DL (ref 1.6–2.6)
MCH RBC QN AUTO: 31.3 PG (ref 26–35)
MCHC RBC AUTO-ENTMCNC: 34.1 % (ref 32–34.5)
MCV RBC AUTO: 91.8 FL (ref 80–99.9)
MONOCYTES ABSOLUTE: 0.95 E9/L (ref 0.1–0.95)
MONOCYTES RELATIVE PERCENT: 11.6 % (ref 2–12)
NEUTROPHILS ABSOLUTE: 5.86 E9/L (ref 1.8–7.3)
NEUTROPHILS RELATIVE PERCENT: 71.4 % (ref 43–80)
PDW BLD-RTO: 14 FL (ref 11.5–15)
PHOSPHORUS: 2.9 MG/DL (ref 2.5–4.5)
PLATELET # BLD: 213 E9/L (ref 130–450)
PMV BLD AUTO: 10.3 FL (ref 7–12)
POTASSIUM SERPL-SCNC: 4.3 MMOL/L (ref 3.5–5)
RBC # BLD: 3.29 E12/L (ref 3.8–5.8)
SODIUM BLD-SCNC: 135 MMOL/L (ref 132–146)
TOTAL PROTEIN: 5.9 G/DL (ref 6.4–8.3)
WBC # BLD: 8.2 E9/L (ref 4.5–11.5)
WOUND/ABSCESS: NORMAL

## 2018-08-09 PROCEDURE — 80053 COMPREHEN METABOLIC PANEL: CPT

## 2018-08-09 PROCEDURE — 82330 ASSAY OF CALCIUM: CPT

## 2018-08-09 PROCEDURE — 6370000000 HC RX 637 (ALT 250 FOR IP): Performed by: INTERNAL MEDICINE

## 2018-08-09 PROCEDURE — 2580000003 HC RX 258: Performed by: INTERNAL MEDICINE

## 2018-08-09 PROCEDURE — 83735 ASSAY OF MAGNESIUM: CPT

## 2018-08-09 PROCEDURE — 2500000003 HC RX 250 WO HCPCS: Performed by: INTERNAL MEDICINE

## 2018-08-09 PROCEDURE — 36415 COLL VENOUS BLD VENIPUNCTURE: CPT

## 2018-08-09 PROCEDURE — 2060000000 HC ICU INTERMEDIATE R&B

## 2018-08-09 PROCEDURE — 85025 COMPLETE CBC W/AUTO DIFF WBC: CPT

## 2018-08-09 PROCEDURE — 84166 PROTEIN E-PHORESIS/URINE/CSF: CPT

## 2018-08-09 PROCEDURE — 84100 ASSAY OF PHOSPHORUS: CPT

## 2018-08-09 PROCEDURE — 6370000000 HC RX 637 (ALT 250 FOR IP): Performed by: SPECIALIST

## 2018-08-09 PROCEDURE — 97530 THERAPEUTIC ACTIVITIES: CPT

## 2018-08-09 PROCEDURE — 86334 IMMUNOFIX E-PHORESIS SERUM: CPT

## 2018-08-09 PROCEDURE — 6360000002 HC RX W HCPCS: Performed by: INTERNAL MEDICINE

## 2018-08-09 RX ORDER — AMLODIPINE BESYLATE 5 MG/1
5 TABLET ORAL NIGHTLY
Status: DISCONTINUED | OUTPATIENT
Start: 2018-08-09 | End: 2018-08-10 | Stop reason: HOSPADM

## 2018-08-09 RX ORDER — CLINDAMYCIN HYDROCHLORIDE 300 MG/1
300 CAPSULE ORAL EVERY 8 HOURS SCHEDULED
Qty: 30 CAPSULE | Refills: 0 | Status: SHIPPED | OUTPATIENT
Start: 2018-08-09 | End: 2018-08-19

## 2018-08-09 RX ORDER — HYDRALAZINE HYDROCHLORIDE 50 MG/1
100 TABLET, FILM COATED ORAL EVERY 8 HOURS SCHEDULED
Status: DISCONTINUED | OUTPATIENT
Start: 2018-08-09 | End: 2018-08-10 | Stop reason: HOSPADM

## 2018-08-09 RX ORDER — CEPHALEXIN 500 MG/1
1000 CAPSULE ORAL 2 TIMES DAILY
Qty: 40 CAPSULE | Refills: 0 | Status: SHIPPED | OUTPATIENT
Start: 2018-08-09 | End: 2018-08-19

## 2018-08-09 RX ADMIN — CLINDAMYCIN HYDROCHLORIDE 300 MG: 150 CAPSULE ORAL at 21:39

## 2018-08-09 RX ADMIN — HYDRALAZINE HYDROCHLORIDE 100 MG: 50 TABLET ORAL at 21:38

## 2018-08-09 RX ADMIN — Medication 2000 UNITS: at 06:01

## 2018-08-09 RX ADMIN — CLINDAMYCIN HYDROCHLORIDE 300 MG: 150 CAPSULE ORAL at 14:35

## 2018-08-09 RX ADMIN — AMLODIPINE BESYLATE 5 MG: 5 TABLET ORAL at 21:39

## 2018-08-09 RX ADMIN — ENOXAPARIN SODIUM 30 MG: 100 INJECTION SUBCUTANEOUS at 21:52

## 2018-08-09 RX ADMIN — Medication: at 16:42

## 2018-08-09 RX ADMIN — CEPHALEXIN 1000 MG: 500 CAPSULE ORAL at 21:39

## 2018-08-09 RX ADMIN — CEPHALEXIN 1000 MG: 500 CAPSULE ORAL at 09:11

## 2018-08-09 RX ADMIN — Medication 10 ML: at 09:12

## 2018-08-09 RX ADMIN — CLINDAMYCIN HYDROCHLORIDE 300 MG: 150 CAPSULE ORAL at 06:01

## 2018-08-09 RX ADMIN — HYDRALAZINE HYDROCHLORIDE 75 MG: 50 TABLET ORAL at 14:35

## 2018-08-09 RX ADMIN — HYDRALAZINE HYDROCHLORIDE 75 MG: 50 TABLET ORAL at 06:01

## 2018-08-09 RX ADMIN — AMLODIPINE BESYLATE 2.5 MG: 2.5 TABLET ORAL at 09:10

## 2018-08-09 RX ADMIN — SODIUM BICARBONATE: 84 INJECTION, SOLUTION INTRAVENOUS at 07:46

## 2018-08-09 RX ADMIN — ASPIRIN 81 MG 81 MG: 81 TABLET ORAL at 09:11

## 2018-08-09 ASSESSMENT — PAIN SCALES - GENERAL
PAINLEVEL_OUTOF10: 0
PAINLEVEL_OUTOF10: 0

## 2018-08-09 NOTE — PROGRESS NOTES
Subjective: The patient is awake and alert. No problems overnight. Denies chest pain, angina, and dyspnea. Denies abdominal pain. Tolerating diet. No nausea or vomiting. Objective:    BP (!) 170/60   Pulse 56   Temp 96.5 °F (35.8 °C) (Oral)   Resp 18   Ht 5' 9\" (1.753 m)   Wt 202 lb 14.4 oz (92 kg)   SpO2 99%   BMI 29.96 kg/m²   Neck: No goiter, bruit, or LA  Heart:  RRR, no murmurs, gallops, or rubs. Lungs:  CTA bilaterally, no wheeze, rales or rhonchi  Abd: bowel sounds present, nontender, nondistended, no masses  Extrem:  No clubbing, cyanosis,  2+ peripheral pulses, FROM. Chronic 1-2+ left lower swelling with chronic scaling and stasis changes. Right lower ext with 2+ swelling, erythema and warmth.     CBC:   Lab Results   Component Value Date    WBC 8.2 08/09/2018    RBC 3.29 08/09/2018    HGB 10.3 08/09/2018    HCT 30.2 08/09/2018    MCV 91.8 08/09/2018    MCH 31.3 08/09/2018    MCHC 34.1 08/09/2018    RDW 14.0 08/09/2018     08/09/2018    MPV 10.3 08/09/2018     CMP:    Lab Results   Component Value Date     08/09/2018    K 4.3 08/09/2018     08/09/2018    CO2 16 08/09/2018    BUN 52 08/09/2018    CREATININE 1.8 08/09/2018    GFRAA 43 08/09/2018    LABGLOM 36 08/09/2018    GLUCOSE 151 08/09/2018    PROT 5.9 08/09/2018    LABALBU 2.7 08/09/2018    CALCIUM 8.4 08/09/2018    BILITOT 0.5 08/09/2018    ALKPHOS 85 08/09/2018    AST 18 08/09/2018    ALT 11 08/09/2018          Current Facility-Administered Medications:     hydrALAZINE (APRESOLINE) tablet 75 mg, 75 mg, Oral, 3 times per day, Saray Ochoa MD, 75 mg at 08/09/18 0601    sodium bicarbonate 75 mEq in dextrose 5 % and 0.45 % NaCl 1,000 mL infusion, , Intravenous, Continuous, Saray Ochoa MD, Last Rate: 75 mL/hr at 08/08/18 1811    cephALEXin (KEFLEX) capsule 1,000 mg, 1,000 mg, Oral, 2 times per day, Dayana Andino MD, 1,000 mg at 08/08/18 2041    clindamycin (CLEOCIN) capsule 300 mg, 300 mg, Oral, 3 times per day, Alba Watts MD, 300 mg at 08/09/18 0601    aspirin chewable tablet 81 mg, 81 mg, Oral, Daily, Mike Ojeda MD, 81 mg at 08/08/18 0850    amLODIPine (NORVASC) tablet 2.5 mg, 2.5 mg, Oral, Daily, Mike Ojeda MD, 2.5 mg at 08/08/18 0850    vitamin D tablet 2,000 Units, 2,000 Units, Oral, Daily, Mike Ojeda MD, 2,000 Units at 08/09/18 0601    sodium chloride flush 0.9 % injection 10 mL, 10 mL, Intravenous, 2 times per day, Mike Ojeda MD, 10 mL at 08/07/18 0857    sodium chloride flush 0.9 % injection 10 mL, 10 mL, Intravenous, PRN, Mike Ojeda MD    magnesium hydroxide (MILK OF MAGNESIA) 400 MG/5ML suspension 30 mL, 30 mL, Oral, Daily PRN, Mike Ojeda MD    ondansetron [facility]) injection 4 mg, 4 mg, Intravenous, Q6H PRN, Mike Ojeda MD    enoxaparin (LOVENOX) injection 30 mg, 30 mg, Subcutaneous, Daily, Mike Ojeda MD, 30 mg at 08/08/18 4490    Assessment:    Patient Active Problem List   Diagnosis    Essential hypertension, benign    Chronic kidney disease, stage III (moderate)    Venous stasis dermatitis of both lower extremities    Sinus bradycardia    Cellulitis    Sepsis ([address]       Plan:  1. Cellulitis of right LE- on po cephalexin and clinda per ID. 2. HTN- tolerating increased hydralazine. I checked his BP this am--> 158/60  3. Acute reanl insuff on CKD- crat 1.8 this am which is near his baseline. Renal on case  4. Non AG met acidosis- on bicarb per renal.   5. Sinus bradycardia- off carvedilol for about 2 weeks. HR has been in the 40's, it's in the 50's this am  6. Chronic venous insuff- cont tubigrips for compression  7.  Possible d/c tomorrow      Mike Ojeda  6:51 AM  8/9/2018

## 2018-08-09 NOTE — PROGRESS NOTES
Nephrology  Note  Patient's Name: Avelino Stack  4:17 PM  8/9/2018    Nephrologist: Christine Quinn    Reason for Consult:  Stage II IRWIN  Requesting Physician:  Beulah Loza MD    Chief Complaint:  Leg Pain    History Obtained From:  patient, spouse/SO and past medical records    History of Present Ilness:    Avelino Stack is a 80 y.o. male with prior history of CKD G3B with a baseline serum cr 1.6mg/dl with a e-GFR=-41ml/min. He states he has been having more problems walking and had leg weakness. He saw his primary care doctor on 7/25 and the HCTZ was stopped and hydralazine was initiated. He had edema and saw primary doctor 8/2 for edema and he had an US (-) for DVT. He also had a fall and the EMS had to get him up. He developed erythema of the RLE and went back to the primary care MD and was sent to the ER.    8/9/18: Pt up inchair feels OK no new complaint-wife at bedside    Past Medical History:   Diagnosis Date    Arthritis     Hypertension        Past Surgical History:   Procedure Laterality Date    HERNIA REPAIR      JOINT REPLACEMENT      JOINT REPLACEMENT      right knee       No family history on file. reports that he has never smoked. He has never used smokeless tobacco. He reports that he drinks about 1.8 oz of alcohol per week . He reports that he does not use drugs. Allergies:  Patient has no known allergies.     Current Medications:      hydrALAZINE (APRESOLINE) tablet 75 mg 3 times per day   sodium bicarbonate 75 mEq in dextrose 5 % and 0.45 % NaCl 1,000 mL infusion Continuous   cephALEXin (KEFLEX) capsule 1,000 mg 2 times per day   clindamycin (CLEOCIN) capsule 300 mg 3 times per day   aspirin chewable tablet 81 mg Daily   amLODIPine (NORVASC) tablet 2.5 mg Daily   vitamin D tablet 2,000 Units Daily   sodium chloride flush 0.9 % injection 10 mL 2 times per day   sodium chloride flush 0.9 % injection 10 mL PRN   magnesium hydroxide (MILK OF MAGNESIA) 400 MG/5ML suspension 30 mL No hydronephrosis,   perinephric collections, renal calculi or solid renal masses are   demonstrated. The visualized urinary bladder is unremarkable.           Impression   1. No evidence of hydronephrosis, perinephric collections, renal   calculi or solid renal masses. 2. Slightly increased echogenicity of the renal parenchyma bilaterally   with minimal bilateral renal cortical thinning. Findings can be seen   in the setting of medical renal disease. Recommend clinical   correlation.          Assessment  1-Stage II IRWIN secondary to presumed combined effects of decreased effective renal perfusion due to the ACEI in the setting of the sepsis and cellulitis with  Inflammatory cytokines-renal us no hydro-urine electrolytes show FeNa 1.09% and UA with 30mg/dl protein with an   in the setting of the acute inflammatory-cr improving with the IVF-continue IVF and adjust the fluid HCO3 content for the acidosis    2-CKD G3B with a baseline serum cr 1.6mg/dl with a e-GFR=-41ml/min presumed sec to microvascular disease due to HTN and glomerular senescence of aging    3-Non Anion Gap Metabolic Acidosis in the setting of the IRWIN-HCO3 minimal change will adjust the IVF    4-Anemia in CKD -HgB >11 no LYNDA indicated-check Ferritin 492 and iron sat 20%, B12 814, folate  >20    5-HTN with CKD I-IV with  Bradycardia-cardiology following-avoid (-) chronotropic meds-BP above goal <130/80- increase the hydralazine to 100mg tid  And titrate the amlodipine to 5mg at HS-and follow on the increased hydralazine and amlodipine    6- Cellulitis of the RLE-on cefazolin and Clinda-BC NG    7- hypocalcemia with Hypoalbuminemia in the setting of the Sec HPTH-PTH in goal 61 and Vit D 37-ionized Ca++ 1.30 WNL    If BP  And Acidosis improved in the AM OK for D/C  Thank you  for allowing me to participate in care of Susy Oswald  4:17 PM  8/9/2018

## 2018-08-09 NOTE — PROGRESS NOTES
Physical Therapy    Facility/Department: [facility] MED SURG  Treatment note    NAME: Carlene Gonzales  : 1927  MRN: [id number]    Date of Service: 2018    Patient Diagnosis(es): There were no encounter diagnoses. has a past medical history of Arthritis and Hypertension. has a past surgical history that includes joint replacement; joint replacement; and hernia repair. Evaluating Therapist: Beth Perry PT     Room #: 898   DIAGNOSIS:  Cellulitis   PRECAUTIONS: falls, [place]     Social:  Pt lives with wife  in a  1  floor plan  3  steps and  1  rails to enter. Prior to admission pt walked with  Venita Mora      Initial Evaluation  Date: 18  Treatment  18    Short Term/ Long Term   Goals   Was pt agreeable to Eval/treatment? yes  yes    Does pt have pain? R LE  3 of 10 R LE pain    Bed Mobility  Rolling:  NT   Supine to sit:  NT   Sit to supine:  NT   Scooting:  NT  Pt sitting in chair upon arrival  NT SBA    Transfers Sit to stand:  SBA   Stand to sit:  SBA   Stand pivot:  SBA  Sit to stand: SBA  Stand to sit: SBA  Stand Pivot: NT  S/I    Ambulation    350  feet with  Cane  with  SBA  240 feet x 1 with standard cane SBA  350  feet with  Cane  with  S/I, no LOB        Stair negotiation: ascended and descended Nt  NT due to short IV line  4  steps with  1 rail with  S/ I    LE ROM  WFL      LE strength  4-/ 5      AM- PAC RAW score        Balance: fair dynamic using standard cane for support    Patient education  Pt was educated on transfer training to reach for chair armrests prior to sitting, energy conservation at home    Patient response to education:   Pt verbalized understanding Pt demonstrated skill Pt requires further education in this area   yes With instruction yes     Additional Comments: Nurse ok with Rx. Pt found in a bedside chair, Wife present and with good encouragement.  Gait performed in the hallway, noted good 2 point gait pattern, no loss of balance noted, Pt slightly short of breath. Pt on room air all rx, 02 saturation 98% after activity. Pt was left in a bedside chair as found with call light in reach. Chair/bed alarm: chair alarm active    Treatment time: 13 minutes  Time out: 1048    Pt is making good progress toward established Physical Therapy goals. Continue with physical therapy current plan of care.     Tiarra Castillo PTA   License Number: [id number]

## 2018-08-10 ENCOUNTER — TELEPHONE (OUTPATIENT)
Dept: ADMINISTRATIVE | Age: 83
End: 2018-08-10

## 2018-08-10 VITALS
TEMPERATURE: 98.7 F | SYSTOLIC BLOOD PRESSURE: 171 MMHG | RESPIRATION RATE: 16 BRPM | OXYGEN SATURATION: 96 % | DIASTOLIC BLOOD PRESSURE: 68 MMHG | HEIGHT: 69 IN | WEIGHT: 205.6 LBS | HEART RATE: 78 BPM | BODY MASS INDEX: 30.45 KG/M2

## 2018-08-10 LAB
ADDENDUM ELECTROPHORESIS URINE RANDOM: NORMAL
ALBUMIN SERPL-MCNC: 2.3 G/DL (ref 3.5–5.2)
ALP BLD-CCNC: 76 U/L (ref 40–129)
ALT SERPL-CCNC: 10 U/L (ref 0–40)
ANION GAP SERPL CALCULATED.3IONS-SCNC: 11 MMOL/L (ref 7–16)
AST SERPL-CCNC: 16 U/L (ref 0–39)
BASOPHILS ABSOLUTE: 0.01 E9/L (ref 0–0.2)
BASOPHILS RELATIVE PERCENT: 0.1 % (ref 0–2)
BILIRUB SERPL-MCNC: 0.5 MG/DL (ref 0–1.2)
BUN BLDV-MCNC: 47 MG/DL (ref 8–23)
CALCIUM SERPL-MCNC: 7.9 MG/DL (ref 8.6–10.2)
CHLORIDE BLD-SCNC: 105 MMOL/L (ref 98–107)
CO2: 19 MMOL/L (ref 22–29)
CREAT SERPL-MCNC: 1.8 MG/DL (ref 0.7–1.2)
EOSINOPHILS ABSOLUTE: 0.23 E9/L (ref 0.05–0.5)
EOSINOPHILS RELATIVE PERCENT: 2.6 % (ref 0–6)
GFR AFRICAN AMERICAN: 43
GFR NON-AFRICAN AMERICAN: 36 ML/MIN/1.73
GLUCOSE BLD-MCNC: 130 MG/DL (ref 74–109)
HCT VFR BLD CALC: 27.7 % (ref 37–54)
HEMOGLOBIN: 9.5 G/DL (ref 12.5–16.5)
IMMATURE GRANULOCYTES #: 0.16 E9/L
IMMATURE GRANULOCYTES %: 1.8 % (ref 0–5)
IMMUNOFIXATION URINE: NORMAL
LYMPHOCYTES ABSOLUTE: 1.06 E9/L (ref 1.5–4)
LYMPHOCYTES RELATIVE PERCENT: 12.2 % (ref 20–42)
MAGNESIUM: 2.1 MG/DL (ref 1.6–2.6)
MCH RBC QN AUTO: 31.1 PG (ref 26–35)
MCHC RBC AUTO-ENTMCNC: 34.3 % (ref 32–34.5)
MCV RBC AUTO: 90.8 FL (ref 80–99.9)
MONOCYTES ABSOLUTE: 0.81 E9/L (ref 0.1–0.95)
MONOCYTES RELATIVE PERCENT: 9.3 % (ref 2–12)
NEUTROPHILS ABSOLUTE: 6.45 E9/L (ref 1.8–7.3)
NEUTROPHILS RELATIVE PERCENT: 74 % (ref 43–80)
PDW BLD-RTO: 13.9 FL (ref 11.5–15)
PHOSPHORUS: 2.7 MG/DL (ref 2.5–4.5)
PLATELET # BLD: 217 E9/L (ref 130–450)
PMV BLD AUTO: 10.1 FL (ref 7–12)
POTASSIUM SERPL-SCNC: 4 MMOL/L (ref 3.5–5)
RBC # BLD: 3.05 E12/L (ref 3.8–5.8)
SODIUM BLD-SCNC: 135 MMOL/L (ref 132–146)
TOTAL PROTEIN: 5.3 G/DL (ref 6.4–8.3)
WBC # BLD: 8.7 E9/L (ref 4.5–11.5)

## 2018-08-10 PROCEDURE — 2500000003 HC RX 250 WO HCPCS: Performed by: INTERNAL MEDICINE

## 2018-08-10 PROCEDURE — 80053 COMPREHEN METABOLIC PANEL: CPT

## 2018-08-10 PROCEDURE — 83735 ASSAY OF MAGNESIUM: CPT

## 2018-08-10 PROCEDURE — 6370000000 HC RX 637 (ALT 250 FOR IP): Performed by: SPECIALIST

## 2018-08-10 PROCEDURE — 6370000000 HC RX 637 (ALT 250 FOR IP): Performed by: INTERNAL MEDICINE

## 2018-08-10 PROCEDURE — 84100 ASSAY OF PHOSPHORUS: CPT

## 2018-08-10 PROCEDURE — 85025 COMPLETE CBC W/AUTO DIFF WBC: CPT

## 2018-08-10 PROCEDURE — 36415 COLL VENOUS BLD VENIPUNCTURE: CPT

## 2018-08-10 PROCEDURE — 2580000003 HC RX 258: Performed by: INTERNAL MEDICINE

## 2018-08-10 RX ORDER — HYDRALAZINE HYDROCHLORIDE 100 MG/1
100 TABLET, FILM COATED ORAL 3 TIMES DAILY
Qty: 90 TABLET | Refills: 5 | Status: SHIPPED | OUTPATIENT
Start: 2018-08-10 | End: 2019-02-12

## 2018-08-10 RX ORDER — AMLODIPINE BESYLATE 5 MG/1
2.5 TABLET ORAL NIGHTLY
Qty: 30 TABLET | Refills: 3 | Status: SHIPPED | OUTPATIENT
Start: 2018-08-10

## 2018-08-10 RX ORDER — ASPIRIN 81 MG/1
81 TABLET, CHEWABLE ORAL DAILY
Qty: 30 TABLET | Refills: 3 | Status: ON HOLD | OUTPATIENT
Start: 2018-08-10 | End: 2019-02-15

## 2018-08-10 RX ADMIN — Medication 2000 UNITS: at 05:13

## 2018-08-10 RX ADMIN — Medication: at 05:27

## 2018-08-10 RX ADMIN — ASPIRIN 81 MG 81 MG: 81 TABLET ORAL at 08:37

## 2018-08-10 RX ADMIN — CEPHALEXIN 1000 MG: 500 CAPSULE ORAL at 08:37

## 2018-08-10 RX ADMIN — HYDRALAZINE HYDROCHLORIDE 100 MG: 50 TABLET ORAL at 05:13

## 2018-08-10 RX ADMIN — CLINDAMYCIN HYDROCHLORIDE 300 MG: 150 CAPSULE ORAL at 05:13

## 2018-08-10 ASSESSMENT — PAIN SCALES - GENERAL: PAINLEVEL_OUTOF10: 0

## 2018-08-10 NOTE — PROGRESS NOTES
CARDIOLOGY  INPATIENT PROGRESS NOTE       Date:  8/10/2018    Patient: Williams Faith, 80 y.o., 9/2/1927  Admission:  8/6/2018  5:02 PM,  LOS: 4 days   Admit DX: Cellulitis [L03.90]  Sepsis ([facility] [address] [A41.9]      Chief Complaint:  Patient is being seen in follow up for bradycardia, HTN     Known to Dr. Ortiz Ip:    Williams Faith was seen and examined. Notes and labs reviewed. No CP no SOB. Improved activity tolerance from time of admission. There were not complications over night. ROS:   Cardiac: As per HPI   General: No fever, chills   Pulmonary: As per HPI   HEENT: No diplopia, dysphagia, epistaxis   GI: No nausea, vomiting, abdominal pain, hematochezia or melena  : No dysuria or hematuria   Endocrine: no polydipsia, polyuria  Musculoskeletal: RAYGOZA x 4, no arthritis   Skin: no rash or pruritis  Neuro/Psych: No headache or focal motor/sensory deficits    OBJECTIVE:        Intake/Output Summary (Last 24 hours) at 08/10/18 0820  Last data filed at 08/10/18 0702   Gross per 24 hour   Intake           939.18 ml   Output              875 ml   Net            64.18 ml       PHYSICAL EXAM: /60 Comment: manual  Pulse 52   Temp 98.5 °F (36.9 °C) (Oral)   Resp 18   Ht 5' 9\" (1.753 m)   Wt 205 lb 9.6 oz (93.3 kg)   SpO2 96%   BMI 30.36 kg/m²       CONST: Well developed, well nourished, NAD   HEENT: NC/AT, EOMI, no icterus, MMM, no rhinorrhea, no perioral lesions   NECK: Supple, no JVD, trachea medline, no thyromegaly. no carotid bruits   RESP: No respiratory distress. Normal effort. No accessory muscle use. LUNGS: Clear to auscultation bilaterally. No wheezes, rales, or rhonchi. CV: PMI non-displaced. No parasternal heave. No thrill. Heart:  RRR normal S1S2, 1/6  murmur, no gallop or rub. PV: +2 RLE and +1 LLE edema. No varicosities. Radial, DP/PT pulses +2/4 bilaterally.     No nailbed clubbing or cyanosis    GI: Abd soft, non-tender, no guarding, +bowel sounds, no ascites, abd distended    MSK:  RAYGOZA, no gross joint deformities. Normal physiologic spinal curvature   NEURO: CN 2-12 grossly intact, no tremors   LYMP:  No adenopathy or lymphedema   SKIN: warm, dry, no rash on exposed areas   PSYCH:  A&O; Mood, affect and behavior appropriate    Current Inpatient Medications:   amLODIPine  5 mg Oral Nightly    hydrALAZINE  100 mg Oral 3 times per day    cephALEXin  1,000 mg Oral 2 times per day    clindamycin  300 mg Oral 3 times per day    aspirin  81 mg Oral Daily    vitamin D  2,000 Units Oral Daily    sodium chloride flush  10 mL Intravenous 2 times per day    enoxaparin  30 mg Subcutaneous Daily       IV Infusions (if any):   IV infusion builder 75 mL/hr at 08/10/18 0527       LABS:    CBC:   Recent Labs      08/09/18   0405  08/10/18   0405   WBC  8.2  8.7   HGB  10.3*  9.5*   HCT  30.2*  27.7*   PLT  213  217     BMP: Recent Labs      08/09/18   0405  08/10/18   0405   NA  135  135   K  4.3  4.0   CO2  16*  19*   BUN  52*  47*   CREATININE  1.8*  1.8*   LABGLOM  36  36   GLUCOSE  151*  130*     Mag:   Recent Labs      08/09/18   0405  08/10/18   0405   MG  2.2  2.1     Phos:   Recent Labs      08/09/18   0405  08/10/18   0405   PHOS  2.9  2.7         Liver Profile: Recent Labs      08/09/18   0405  08/10/18   0405   AST  18  16   ALT  11  10   LABALBU  2.7*  2.3*       Diagnostics:      EKG:  Junctional bradycardia with occasional premature ventricular complexes 8/7/2018  ECHO:   3/29/2018 Dr. Ceja Old Technically limited study. No Definity used, off-axis imaging   Left ventricle was not well visualized.   Grossly normal left ventricle size.   No gross regional wall motion abnormality.   Indeterminate diastolic function.   Ejection fraction is visually estimated at 55%.   The right ventricle was not clearly visualized.   Mild mitral regurgitation is present.   Aortic valve opens well. No hemodynamically significant aortic stenosis is   present.  Mild aortic regurgitation is

## 2018-08-10 NOTE — PROGRESS NOTES
[facility] Quality Flow/Interdisciplinary Rounds Progress Note        Quality Flow Rounds held on August 10, 2018    Disciplines Attending:  Bedside Nurse, ,  and Nursing Unit [address] [place], [place] [address]paulie Gomez was admitted on 8/6/2018  5:02 PM    Anticipated Discharge Date:  Expected Discharge Date: 08/08/18    Disposition:    Claudio Score:  Claudio Scale Score: 19    Readmission Risk              Risk of Unplanned Readmission:        16             Discussed patient goal for the day, patient clinical progression, and barriers to discharge. The following Goal(s) of the Day/Commitment(s) have been identified:  Discharge home today.       Gabi Quigley  August 10, 2018

## 2018-08-10 NOTE — PROGRESS NOTES
Subjective: The patient is awake and alert. No problems overnight. Denies chest pain, angina, and dyspnea. Denies abdominal pain. Tolerating diet. No nausea or vomiting. No fever. WBC down. Erythema of RLE has faded some. BP is improved with increased hydralazine and amlodipine. Renal function back to near baseline. Metabolic acidosis improving. Objective:    /60 Comment: manual  Pulse 52   Temp 98.5 °F (36.9 °C) (Oral)   Resp 18   Ht 5' 9\" (1.753 m)   Wt 205 lb 9.6 oz (93.3 kg)   SpO2 96%   BMI 30.36 kg/m²   Neck: No goiter, bruit, or LA  Heart:  RRR, no murmurs, gallops, or rubs. Lungs:  CTA bilaterally, no wheeze, rales or rhonchi  Abd: bowel sounds present, nontender, nondistended, no masses  Extrem:  No clubbing, cyanosis,  2+ peripheral pulses, FROM. 1+ LLE swelling with chronic stasis changes. 2+ RLE swelling.  Erythema is improving    CBC:   Lab Results   Component Value Date    WBC 8.7 08/10/2018    RBC 3.05 08/10/2018    HGB 9.5 08/10/2018    HCT 27.7 08/10/2018    MCV 90.8 08/10/2018    MCH 31.1 08/10/2018    MCHC 34.3 08/10/2018    RDW 13.9 08/10/2018     08/10/2018    MPV 10.1 08/10/2018     CMP:    Lab Results   Component Value Date     08/10/2018    K 4.0 08/10/2018     08/10/2018    CO2 19 08/10/2018    BUN 47 08/10/2018    CREATININE 1.8 08/10/2018    GFRAA 43 08/10/2018    LABGLOM 36 08/10/2018    GLUCOSE 130 08/10/2018    PROT 5.3 08/10/2018    LABALBU 2.3 08/10/2018    CALCIUM 7.9 08/10/2018    BILITOT 0.5 08/10/2018    ALKPHOS 76 08/10/2018    AST 16 08/10/2018    ALT 10 08/10/2018          Current Facility-Administered Medications:     amLODIPine (NORVASC) tablet 5 mg, 5 mg, Oral, Nightly, Brittney Vanessa MD, 5 mg at 08/09/18 2139    hydrALAZINE (APRESOLINE) tablet 100 mg, 100 mg, Oral, 3 times per day, Brittney Vanessa MD, 100 mg at 08/10/18 0563    sodium bicarbonate 150 mEq in dextrose 5 % 1,000 mL infusion, , Intravenous, Continuous, Chen Acevedo MD, Last Rate: 75 mL/hr at 08/10/18 0527    cephALEXin [facility]) capsule 1,000 mg, 1,000 mg, Oral, 2 times per day, Neville Lacey MD, 1,000 mg at 08/09/18 2139    clindamycin (CLEOCIN) capsule 300 mg, 300 mg, Oral, 3 times per day, Neville Lacey MD, 300 mg at 08/10/18 4527    aspirin chewable tablet 81 mg, 81 mg, Oral, Daily, Rogelio Bobo MD, 81 mg at 08/09/18 0911    vitamin D tablet 2,000 Units, 2,000 Units, Oral, Daily, Rogelio Boob MD, 2,000 Units at 08/10/18 0513    sodium chloride flush 0.9 % injection 10 mL, 10 mL, Intravenous, 2 times per day, Rogelio Bobo MD, 10 mL at 08/09/18 0912    sodium chloride flush 0.9 % injection 10 mL, 10 mL, Intravenous, PRN, Rogelio Bobo MD    magnesium hydroxide (MILK OF MAGNESIA) 400 MG/5ML suspension 30 mL, 30 mL, Oral, Daily PRN, Rogelio Bobo MD    ondansetron [facility]) injection 4 mg, 4 mg, Intravenous, Q6H PRN, Rogelio Bobo MD    enoxaparin (LOVENOX) injection 30 mg, 30 mg, Subcutaneous, Daily, Rogelio Bobo MD, 30 mg at 08/09/18 2152    Assessment:    Patient Active Problem List   Diagnosis    Essential hypertension, benign    Chronic kidney disease, stage III (moderate)    Venous stasis dermatitis of both lower extremities    Sinus bradycardia    Cellulitis    Sepsis ([facility] [address]    Bradycardia by electrocardiography       Plan:  1. Cellulitis- complete oral antibiotic course (cephalexin/clindamycin) per ID  2. Acute renal failure on CKD4- much better with IVF and off lisinopril. Creat is 1.8 which is close to his baseline  3. Non AG metabolic acidosis- improving  4. HTN- improved with increased doses of norvasc and hydralazine  5. Bradycardia- overall improved into 60's presently  6. Chronic venous insuff- discussed with pt that he must wear compression every single day  7. Discharge home.  He has a f/u appt with me on 8/13        Mercado Brace  6:21 AM  8/10/2018

## 2018-08-10 NOTE — TELEPHONE ENCOUNTER
Pt calling to see if a hfu is needed with Dr. Georgina Vance or just a yearly in March 2019? Advise please.

## 2018-08-11 LAB
BLOOD CULTURE, ROUTINE: NORMAL
CULTURE, BLOOD 2: NORMAL

## 2018-08-11 NOTE — DISCHARGE SUMMARY
[id number] [address]                                 DISCHARGE SUMMARY    PATIENT NAME: Rocio Jones                     :        1927  MED REC NO:   [id number]                            ROOM:       0612  ACCOUNT NO:   [de-identified]                           ADMIT DATE: 2018  PROVIDER:     Dawn Cuevas MD                  [facility] DATE: 08/10/2018    DATE OF ADMISSION:  2018    DATE OF DISCHARGE:  08/10/2018    FINAL DIAGNOSES:  1. Cellulitis of the right lower extremity. 2.  Acute renal failure. 3.  Chronic kidney disease stage IV. 4.  Hypertension. 5.  Chronic venous insufficiency. 6.  Dementia. 7.  Non-anion gap metabolic acidosis. COURSE OF ILLNESS:  This is a 66-year-old gentleman directly admitted from  my office with increasing swelling, pain, and erythema in his right lower  extremity. The patient had an obvious cellulitis and was admitted for IV  antibiotics. He was started on IV cefazolin and switched over to oral  antibiotics including cephalexin and clindamycin. This was under the  direction of Infectious Disease Services. Additional issues that the  patient had developed with this admission on presentation was acute renal  failure. The patient has baseline creatinine of around 1.6 to 1.8. His  creatinine on admission was 2.5. The patient received some IV fluids and  his lisinopril was held. With treatment of his infection, his creatinine  did slowly improve throughout the admission, and at the time of discharge,  it was 1.8. Another issue that the patient had was issues with his  hypertension. As an outpatient, his carvedilol had been discontinued due  to bradycardia. His hydrochlorothiazide had been discontinued due to  dehydration and acute renal failure. Now, his lisinopril was discontinued  due to his renal failure.   Hydralazine 25 mg t.i.d. had been started

## 2018-08-15 ENCOUNTER — TELEPHONE (OUTPATIENT)
Dept: CARDIOLOGY CLINIC | Age: 83
End: 2018-08-15

## 2018-10-02 ENCOUNTER — APPOINTMENT (OUTPATIENT)
Dept: GENERAL RADIOLOGY | Age: 83
DRG: 291 | End: 2018-10-02
Attending: INTERNAL MEDICINE
Payer: MEDICARE

## 2018-10-02 ENCOUNTER — HOSPITAL ENCOUNTER (INPATIENT)
Age: 83
LOS: 3 days | Discharge: HOME HEALTH | DRG: 291 | End: 2018-10-05
Attending: INTERNAL MEDICINE | Admitting: INTERNAL MEDICINE
Payer: MEDICARE

## 2018-10-02 PROBLEM — I50.43 CHF (CONGESTIVE HEART FAILURE), NYHA CLASS I, ACUTE ON CHRONIC, COMBINED (HCC): Status: RESOLVED | Noted: 2018-10-02 | Resolved: 2018-10-02

## 2018-10-02 PROBLEM — I50.31 ACUTE DIASTOLIC (CONGESTIVE) HEART FAILURE (HCC): Status: ACTIVE | Noted: 2018-10-02

## 2018-10-02 PROBLEM — N18.4 STAGE 4 CHRONIC KIDNEY DISEASE (HCC): Chronic | Status: ACTIVE | Noted: 2018-10-02

## 2018-10-02 PROBLEM — L03.90 CELLULITIS: Status: RESOLVED | Noted: 2018-08-06 | Resolved: 2018-10-02

## 2018-10-02 PROBLEM — I50.43 CHF (CONGESTIVE HEART FAILURE), NYHA CLASS I, ACUTE ON CHRONIC, COMBINED (HCC): Status: ACTIVE | Noted: 2018-10-02

## 2018-10-02 PROBLEM — A41.9 SEPSIS (HCC): Status: RESOLVED | Noted: 2018-08-06 | Resolved: 2018-10-02

## 2018-10-02 PROBLEM — N18.30 CHRONIC KIDNEY DISEASE, STAGE III (MODERATE) (HCC): Status: RESOLVED | Noted: 2017-06-12 | Resolved: 2018-10-02

## 2018-10-02 LAB
ALBUMIN SERPL-MCNC: 3.8 G/DL (ref 3.5–5.2)
ALP BLD-CCNC: 69 U/L (ref 40–129)
ALT SERPL-CCNC: 17 U/L (ref 0–40)
ANION GAP SERPL CALCULATED.3IONS-SCNC: 14 MMOL/L (ref 7–16)
AST SERPL-CCNC: 20 U/L (ref 0–39)
BACTERIA: NORMAL /HPF
BILIRUB SERPL-MCNC: 0.7 MG/DL (ref 0–1.2)
BILIRUBIN URINE: NEGATIVE
BLOOD, URINE: NORMAL
BUN BLDV-MCNC: 46 MG/DL (ref 8–23)
CALCIUM SERPL-MCNC: 8.9 MG/DL (ref 8.6–10.2)
CHLORIDE BLD-SCNC: 102 MMOL/L (ref 98–107)
CLARITY: CLEAR
CO2: 19 MMOL/L (ref 22–29)
COLOR: YELLOW
CREAT SERPL-MCNC: 2.2 MG/DL (ref 0.7–1.2)
CREATININE URINE: 15 MG/DL (ref 40–278)
EKG ATRIAL RATE: 56 BPM
EKG Q-T INTERVAL: 476 MS
EKG QRS DURATION: 98 MS
EKG QTC CALCULATION (BAZETT): 459 MS
EKG R AXIS: -4 DEGREES
EKG T AXIS: 47 DEGREES
EKG VENTRICULAR RATE: 56 BPM
GFR AFRICAN AMERICAN: 34
GFR NON-AFRICAN AMERICAN: 28 ML/MIN/1.73
GLUCOSE BLD-MCNC: 135 MG/DL (ref 74–109)
GLUCOSE URINE: NEGATIVE MG/DL
HCT VFR BLD CALC: 29.1 % (ref 37–54)
HEMOGLOBIN: 9.6 G/DL (ref 12.5–16.5)
KETONES, URINE: NEGATIVE MG/DL
LEUKOCYTE ESTERASE, URINE: NEGATIVE
MCH RBC QN AUTO: 30.8 PG (ref 26–35)
MCHC RBC AUTO-ENTMCNC: 33 % (ref 32–34.5)
MCV RBC AUTO: 93.3 FL (ref 80–99.9)
NITRITE, URINE: NEGATIVE
PDW BLD-RTO: 14.2 FL (ref 11.5–15)
PH UA: 6 (ref 5–9)
PLATELET # BLD: 169 E9/L (ref 130–450)
PMV BLD AUTO: 10.3 FL (ref 7–12)
POTASSIUM SERPL-SCNC: 3.8 MMOL/L (ref 3.5–5)
PRO-BNP: 4557 PG/ML (ref 0–450)
PROTEIN PROTEIN: 26 MG/DL (ref 0–12)
PROTEIN UA: NORMAL MG/DL
PROTEIN/CREAT RATIO: 1.7
PROTEIN/CREAT RATIO: 1.7 (ref 0–0.2)
RBC # BLD: 3.12 E12/L (ref 3.8–5.8)
RBC UA: NORMAL /HPF (ref 0–2)
SODIUM BLD-SCNC: 135 MMOL/L (ref 132–146)
SPECIFIC GRAVITY UA: 1.01 (ref 1–1.03)
TOTAL PROTEIN: 6.8 G/DL (ref 6.4–8.3)
UROBILINOGEN, URINE: 0.2 E.U./DL
WBC # BLD: 8.4 E9/L (ref 4.5–11.5)
WBC UA: NORMAL /HPF (ref 0–5)

## 2018-10-02 PROCEDURE — 93005 ELECTROCARDIOGRAM TRACING: CPT | Performed by: NURSE PRACTITIONER

## 2018-10-02 PROCEDURE — 2060000000 HC ICU INTERMEDIATE R&B

## 2018-10-02 PROCEDURE — 94640 AIRWAY INHALATION TREATMENT: CPT

## 2018-10-02 PROCEDURE — G8987 SELF CARE CURRENT STATUS: HCPCS

## 2018-10-02 PROCEDURE — 6360000002 HC RX W HCPCS: Performed by: INTERNAL MEDICINE

## 2018-10-02 PROCEDURE — 2580000003 HC RX 258: Performed by: INTERNAL MEDICINE

## 2018-10-02 PROCEDURE — 81001 URINALYSIS AUTO W/SCOPE: CPT

## 2018-10-02 PROCEDURE — 36415 COLL VENOUS BLD VENIPUNCTURE: CPT

## 2018-10-02 PROCEDURE — 83880 ASSAY OF NATRIURETIC PEPTIDE: CPT

## 2018-10-02 PROCEDURE — G8988 SELF CARE GOAL STATUS: HCPCS

## 2018-10-02 PROCEDURE — 97166 OT EVAL MOD COMPLEX 45 MIN: CPT

## 2018-10-02 PROCEDURE — 85027 COMPLETE CBC AUTOMATED: CPT

## 2018-10-02 PROCEDURE — 80053 COMPREHEN METABOLIC PANEL: CPT

## 2018-10-02 PROCEDURE — 84156 ASSAY OF PROTEIN URINE: CPT

## 2018-10-02 PROCEDURE — APPSS60 APP SPLIT SHARED TIME 46-60 MINUTES: Performed by: NURSE PRACTITIONER

## 2018-10-02 PROCEDURE — 6370000000 HC RX 637 (ALT 250 FOR IP): Performed by: INTERNAL MEDICINE

## 2018-10-02 PROCEDURE — 97535 SELF CARE MNGMENT TRAINING: CPT

## 2018-10-02 PROCEDURE — 99223 1ST HOSP IP/OBS HIGH 75: CPT | Performed by: INTERNAL MEDICINE

## 2018-10-02 PROCEDURE — 82570 ASSAY OF URINE CREATININE: CPT

## 2018-10-02 PROCEDURE — 71045 X-RAY EXAM CHEST 1 VIEW: CPT

## 2018-10-02 RX ORDER — AMLODIPINE BESYLATE 2.5 MG/1
2.5 TABLET ORAL NIGHTLY
Status: DISCONTINUED | OUTPATIENT
Start: 2018-10-02 | End: 2018-10-03

## 2018-10-02 RX ORDER — SODIUM CHLORIDE 0.9 % (FLUSH) 0.9 %
SYRINGE (ML) INJECTION
Status: DISPENSED
Start: 2018-10-02 | End: 2018-10-02

## 2018-10-02 RX ORDER — ONDANSETRON 2 MG/ML
4 INJECTION INTRAMUSCULAR; INTRAVENOUS EVERY 6 HOURS PRN
Status: DISCONTINUED | OUTPATIENT
Start: 2018-10-02 | End: 2018-10-05 | Stop reason: HOSPADM

## 2018-10-02 RX ORDER — ASPIRIN 81 MG/1
81 TABLET, CHEWABLE ORAL DAILY
Status: DISCONTINUED | OUTPATIENT
Start: 2018-10-02 | End: 2018-10-05 | Stop reason: HOSPADM

## 2018-10-02 RX ORDER — SODIUM CHLORIDE 0.9 % (FLUSH) 0.9 %
10 SYRINGE (ML) INJECTION PRN
Status: DISCONTINUED | OUTPATIENT
Start: 2018-10-02 | End: 2018-10-05 | Stop reason: HOSPADM

## 2018-10-02 RX ORDER — IPRATROPIUM BROMIDE AND ALBUTEROL SULFATE 2.5; .5 MG/3ML; MG/3ML
1 SOLUTION RESPIRATORY (INHALATION) ONCE
Status: COMPLETED | OUTPATIENT
Start: 2018-10-02 | End: 2018-10-02

## 2018-10-02 RX ORDER — HYDRALAZINE HYDROCHLORIDE 25 MG/1
100 TABLET, FILM COATED ORAL 3 TIMES DAILY
Status: DISCONTINUED | OUTPATIENT
Start: 2018-10-02 | End: 2018-10-05 | Stop reason: HOSPADM

## 2018-10-02 RX ORDER — SODIUM BICARBONATE 650 MG/1
650 TABLET ORAL 3 TIMES DAILY
Status: ON HOLD | COMMUNITY
End: 2018-10-05 | Stop reason: HOSPADM

## 2018-10-02 RX ORDER — SODIUM CHLORIDE 0.9 % (FLUSH) 0.9 %
10 SYRINGE (ML) INJECTION EVERY 12 HOURS SCHEDULED
Status: DISCONTINUED | OUTPATIENT
Start: 2018-10-02 | End: 2018-10-05 | Stop reason: HOSPADM

## 2018-10-02 RX ORDER — FUROSEMIDE 10 MG/ML
40 INJECTION INTRAMUSCULAR; INTRAVENOUS 2 TIMES DAILY
Status: COMPLETED | OUTPATIENT
Start: 2018-10-02 | End: 2018-10-02

## 2018-10-02 RX ORDER — SODIUM BICARBONATE 650 MG/1
650 TABLET ORAL 3 TIMES DAILY
Status: DISCONTINUED | OUTPATIENT
Start: 2018-10-02 | End: 2018-10-04

## 2018-10-02 RX ORDER — FUROSEMIDE 10 MG/ML
20 INJECTION INTRAMUSCULAR; INTRAVENOUS ONCE
Status: COMPLETED | OUTPATIENT
Start: 2018-10-02 | End: 2018-10-02

## 2018-10-02 RX ADMIN — HYDRALAZINE HYDROCHLORIDE 100 MG: 50 TABLET, FILM COATED ORAL at 20:39

## 2018-10-02 RX ADMIN — Medication 10 ML: at 20:38

## 2018-10-02 RX ADMIN — SODIUM BICARBONATE 650 MG: 650 TABLET ORAL at 13:21

## 2018-10-02 RX ADMIN — FUROSEMIDE 20 MG: 10 INJECTION, SOLUTION INTRAVENOUS at 20:39

## 2018-10-02 RX ADMIN — SODIUM BICARBONATE 650 MG: 650 TABLET ORAL at 20:39

## 2018-10-02 RX ADMIN — IPRATROPIUM BROMIDE AND ALBUTEROL SULFATE 1 AMPULE: .5; 3 SOLUTION RESPIRATORY (INHALATION) at 17:08

## 2018-10-02 RX ADMIN — PETROLATUM: 42 OINTMENT TOPICAL at 15:41

## 2018-10-02 RX ADMIN — FUROSEMIDE 40 MG: 10 INJECTION, SOLUTION INTRAMUSCULAR; INTRAVENOUS at 17:36

## 2018-10-02 RX ADMIN — AMLODIPINE BESYLATE 2.5 MG: 2.5 TABLET ORAL at 20:39

## 2018-10-02 RX ADMIN — HYDRALAZINE HYDROCHLORIDE 100 MG: 50 TABLET, FILM COATED ORAL at 13:21

## 2018-10-02 RX ADMIN — FUROSEMIDE 40 MG: 10 INJECTION, SOLUTION INTRAMUSCULAR; INTRAVENOUS at 13:21

## 2018-10-03 ENCOUNTER — APPOINTMENT (OUTPATIENT)
Dept: ULTRASOUND IMAGING | Age: 83
DRG: 291 | End: 2018-10-03
Attending: INTERNAL MEDICINE
Payer: MEDICARE

## 2018-10-03 PROBLEM — D64.9 CHRONIC ANEMIA: Chronic | Status: ACTIVE | Noted: 2018-10-03

## 2018-10-03 LAB
ALBUMIN SERPL-MCNC: 3.4 G/DL (ref 3.5–5.2)
ALP BLD-CCNC: 63 U/L (ref 40–129)
ALT SERPL-CCNC: 14 U/L (ref 0–40)
ANION GAP SERPL CALCULATED.3IONS-SCNC: 14 MMOL/L (ref 7–16)
AST SERPL-CCNC: 22 U/L (ref 0–39)
BILIRUB SERPL-MCNC: 0.7 MG/DL (ref 0–1.2)
BUN BLDV-MCNC: 44 MG/DL (ref 8–23)
CALCIUM SERPL-MCNC: 8.8 MG/DL (ref 8.6–10.2)
CHLORIDE BLD-SCNC: 101 MMOL/L (ref 98–107)
CO2: 20 MMOL/L (ref 22–29)
CREAT SERPL-MCNC: 2.2 MG/DL (ref 0.7–1.2)
FERRITIN: 270 NG/ML
GFR AFRICAN AMERICAN: 34
GFR NON-AFRICAN AMERICAN: 28 ML/MIN/1.73
GLUCOSE BLD-MCNC: 117 MG/DL (ref 74–109)
HCT VFR BLD CALC: 27.1 % (ref 37–54)
HEMOGLOBIN: 8.9 G/DL (ref 12.5–16.5)
IRON SATURATION: 15 % (ref 20–55)
IRON: 36 MCG/DL (ref 59–158)
MAGNESIUM: 2.5 MG/DL (ref 1.6–2.6)
MCH RBC QN AUTO: 30.5 PG (ref 26–35)
MCHC RBC AUTO-ENTMCNC: 32.8 % (ref 32–34.5)
MCV RBC AUTO: 92.8 FL (ref 80–99.9)
PDW BLD-RTO: 14 FL (ref 11.5–15)
PLATELET # BLD: 169 E9/L (ref 130–450)
PMV BLD AUTO: 10.8 FL (ref 7–12)
POTASSIUM SERPL-SCNC: 3.5 MMOL/L (ref 3.5–5)
RBC # BLD: 2.92 E12/L (ref 3.8–5.8)
SODIUM BLD-SCNC: 135 MMOL/L (ref 132–146)
TOTAL IRON BINDING CAPACITY: 239 MCG/DL (ref 250–450)
TOTAL PROTEIN: 6.3 G/DL (ref 6.4–8.3)
WBC # BLD: 6.5 E9/L (ref 4.5–11.5)

## 2018-10-03 PROCEDURE — 97161 PT EVAL LOW COMPLEX 20 MIN: CPT

## 2018-10-03 PROCEDURE — 83550 IRON BINDING TEST: CPT

## 2018-10-03 PROCEDURE — 2580000003 HC RX 258: Performed by: INTERNAL MEDICINE

## 2018-10-03 PROCEDURE — 76770 US EXAM ABDO BACK WALL COMP: CPT

## 2018-10-03 PROCEDURE — 83540 ASSAY OF IRON: CPT

## 2018-10-03 PROCEDURE — 36415 COLL VENOUS BLD VENIPUNCTURE: CPT

## 2018-10-03 PROCEDURE — 82728 ASSAY OF FERRITIN: CPT

## 2018-10-03 PROCEDURE — 6370000000 HC RX 637 (ALT 250 FOR IP): Performed by: INTERNAL MEDICINE

## 2018-10-03 PROCEDURE — 80053 COMPREHEN METABOLIC PANEL: CPT

## 2018-10-03 PROCEDURE — 85027 COMPLETE CBC AUTOMATED: CPT

## 2018-10-03 PROCEDURE — 6360000002 HC RX W HCPCS: Performed by: INTERNAL MEDICINE

## 2018-10-03 PROCEDURE — 2060000000 HC ICU INTERMEDIATE R&B

## 2018-10-03 PROCEDURE — 97530 THERAPEUTIC ACTIVITIES: CPT

## 2018-10-03 PROCEDURE — 83735 ASSAY OF MAGNESIUM: CPT

## 2018-10-03 PROCEDURE — 99233 SBSQ HOSP IP/OBS HIGH 50: CPT | Performed by: INTERNAL MEDICINE

## 2018-10-03 PROCEDURE — 97535 SELF CARE MNGMENT TRAINING: CPT

## 2018-10-03 PROCEDURE — 2700000000 HC OXYGEN THERAPY PER DAY

## 2018-10-03 RX ORDER — FERROUS SULFATE 325(65) MG
325 TABLET ORAL 2 TIMES DAILY WITH MEALS
Status: DISCONTINUED | OUTPATIENT
Start: 2018-10-03 | End: 2018-10-05 | Stop reason: HOSPADM

## 2018-10-03 RX ORDER — AMLODIPINE BESYLATE 5 MG/1
5 TABLET ORAL NIGHTLY
Status: DISCONTINUED | OUTPATIENT
Start: 2018-10-03 | End: 2018-10-05 | Stop reason: HOSPADM

## 2018-10-03 RX ORDER — POTASSIUM CHLORIDE 20 MEQ/1
40 TABLET, EXTENDED RELEASE ORAL ONCE
Status: COMPLETED | OUTPATIENT
Start: 2018-10-03 | End: 2018-10-03

## 2018-10-03 RX ORDER — FUROSEMIDE 10 MG/ML
40 INJECTION INTRAMUSCULAR; INTRAVENOUS 2 TIMES DAILY
Status: DISCONTINUED | OUTPATIENT
Start: 2018-10-03 | End: 2018-10-05

## 2018-10-03 RX ADMIN — HYDRALAZINE HYDROCHLORIDE 100 MG: 50 TABLET, FILM COATED ORAL at 09:06

## 2018-10-03 RX ADMIN — HYDRALAZINE HYDROCHLORIDE 100 MG: 50 TABLET, FILM COATED ORAL at 13:49

## 2018-10-03 RX ADMIN — SODIUM BICARBONATE 650 MG: 650 TABLET ORAL at 21:04

## 2018-10-03 RX ADMIN — VITAMIN D, TAB 1000IU (100/BT) 2000 UNITS: 25 TAB at 05:54

## 2018-10-03 RX ADMIN — FERROUS SULFATE TAB 325 MG (65 MG ELEMENTAL FE) 325 MG: 325 (65 FE) TAB at 11:23

## 2018-10-03 RX ADMIN — FUROSEMIDE 40 MG: 10 INJECTION, SOLUTION INTRAMUSCULAR; INTRAVENOUS at 17:23

## 2018-10-03 RX ADMIN — POTASSIUM CHLORIDE 40 MEQ: 20 TABLET, EXTENDED RELEASE ORAL at 13:49

## 2018-10-03 RX ADMIN — ENOXAPARIN SODIUM 30 MG: 30 INJECTION SUBCUTANEOUS at 09:06

## 2018-10-03 RX ADMIN — FERROUS SULFATE TAB 325 MG (65 MG ELEMENTAL FE) 325 MG: 325 (65 FE) TAB at 17:23

## 2018-10-03 RX ADMIN — Medication 10 ML: at 21:04

## 2018-10-03 RX ADMIN — SODIUM BICARBONATE 650 MG: 650 TABLET ORAL at 13:49

## 2018-10-03 RX ADMIN — PETROLATUM: 42 OINTMENT TOPICAL at 09:07

## 2018-10-03 RX ADMIN — SODIUM BICARBONATE 650 MG: 650 TABLET ORAL at 09:06

## 2018-10-03 RX ADMIN — Medication 10 ML: at 09:07

## 2018-10-03 RX ADMIN — FUROSEMIDE 40 MG: 10 INJECTION, SOLUTION INTRAMUSCULAR; INTRAVENOUS at 11:23

## 2018-10-03 RX ADMIN — HYDRALAZINE HYDROCHLORIDE 100 MG: 50 TABLET, FILM COATED ORAL at 21:04

## 2018-10-03 RX ADMIN — AMLODIPINE BESYLATE 5 MG: 5 TABLET ORAL at 21:04

## 2018-10-03 RX ADMIN — ASPIRIN 81 MG: 81 TABLET, CHEWABLE ORAL at 09:06

## 2018-10-03 ASSESSMENT — PAIN SCALES - GENERAL: PAINLEVEL_OUTOF10: 0

## 2018-10-03 NOTE — PROGRESS NOTES
Nephrology Progress Note  [facility]     Reason for Consult:  CKD  Requesting Physician:  Dr Alex Reynoso   Date of Service: 10/3/2018   Chief Complaint:  SOB  History Obtained From:  Patient, medical record, family at beside      History of Present Ilness:      FROM 10-2    Mr Patria Joy is a 80year old male who we are consulted on for evaluation and management of chronic kidney disease    He presented to the hospital on 10-2 with worsening shortness of breath over one week. He also noted worsening edema over one week - unable to start how much weight he gained. He has erythema of lower extremities but wife states that his is chronic. No chest pain, palpitations, abdominal pain, n/v, d/c, f/c.  Good appetite. No urinary complaints. His creatinine was 1.8 on 8-10. He was evaluated in the office of Dr Domi Liang on 8-17 and torsemide 20mg was started. Creatinine on 8-23 increased to 2.9 and torsemide was stopped. Creatinine was 2.3 on 8-27 and 2.2 on admission.       SUBJECTIVE  Seen and examined  Hard of hearing  Denies shortness of breath  No chest pain, abd pain, n/v      Past Medical History:        Diagnosis Date    Arthritis     Hypertension        Past Surgical History:        Procedure Laterality Date    HERNIA REPAIR      JOINT REPLACEMENT      JOINT REPLACEMENT      right knee       Current Medications:    Current Facility-Administered Medications: amLODIPine (NORVASC) tablet 2.5 mg, 2.5 mg, Oral, Nightly  aspirin chewable tablet 81 mg, 81 mg, Oral, Daily  vitamin D (CHOLECALCIFEROL) tablet 2,000 Units, 2,000 Units, Oral, Daily  hydrALAZINE (APRESOLINE) tablet 100 mg, 100 mg, Oral, TID  sodium bicarbonate tablet 650 mg, 650 mg, Oral, TID  sodium chloride flush 0.9 % injection 10 mL, 10 mL, Intravenous, 2 times per day  sodium chloride flush 0.9 % injection 10 mL, 10 mL, Intravenous, PRN  magnesium hydroxide (MILK OF MAGNESIA) 400 MG/5ML suspension 30 mL, 30 mL, Oral, Daily 14.2  14.0   PLT  169  169   MPV  10.3  10.8       Last 3 Hepatic Function Panel:    Recent Labs      10/02/18   1250  10/03/18   0320   ALKPHOS  69  63   ALT  17  14   AST  20  22   PROT  6.8  6.3*   BILITOT  0.7  0.7   LABALBU  3.8  3.4*       Albumin:  Recent Labs      10/03/18   0320   LABALBU  3.4*       Calcium:  Recent Labs      10/03/18   0320   CALCIUM  8.8       Ionized Calcium:  No results for input(s): IONCA in the last 72 hours. Magnesium:    Recent Labs      10/03/18   0320   MG  2.5         ABGs:  No results for input(s): PHART, PO2ART, SZX7URR, KTE3XEZ, BEART, H8YYZOZY in the last 72 hours. Lactic Acid:  No results for input(s): LACTA in the last 72 hours. Last 3 Amylase:  No results for input(s): AMYLASE in the last 72 hours. Last 3 Lipase:  No results for input(s): LIPASE in the last 72 hours. Last 3 BNP:  No results for input(s): BNP in the last 72 hours. XR CHEST PORTABLE [[id number]] Resulted: 10/02/18 1510      Order Status: Completed Updated: 10/02/18 1512     Narrative:       Patient MRN:  [id number]  : 1927  Age: 80 years  Gender: Male    Order Date:  10/2/2018 2:00 PM    EXAM: XR CHEST PORTABLE 1 view    INDICATION:  sob   sob     COMPARISON: 2009    FINDINGS:  There is cardiomegaly. There is mild vascular congestion. There is  minimal atelectasis and pleural effusions in the lung bases. Dilated  bowel loops are identified. Degenerative changes are noted in the  shoulders.     Impression:       Mild CHF.            Assessment/Plan      1 Acute kidney injury on chronic kidney disease stage III  Baseline Creatinine 1.6 - 1.8, as elevated as high as 2.9 on ; Cr 2.2 on admission  CKD risk factors include HTN, age  Urinalysis with trace protein, no RBCs; spot collection with 1.7 grams of proteinuria  Check PVR and renal US = pending  Follow renal function, urine output; avoid nephrotoxins    Renal function stable, close to baseline     2 Volume overload  ECHO

## 2018-10-03 NOTE — PROGRESS NOTES
skill Pt requires further education in this area    x With cues   x     Rehab potential is Good for reaching above PT goals. Pts/ family goals   1. Increase strength /better breathing     Patient and or family understand(s) diagnosis, prognosis, and plan of care. -  Yes ( family)     PLAN  PT care will be provided in accordance with the objectives noted above. Whenever appropriate, clear delegation orders will be provided for nursing staff. Exercises and functional mobility practice will be used as well as appropriate assistive devices or modalities to obtain goals. Patient and family education will also be administered as needed. Frequency of treatments will be 2-3 x/week x  5 days.     Time in:  1034   Time out:  [facility] number:  PT [id number]

## 2018-10-04 LAB
ALBUMIN SERPL-MCNC: 3.2 G/DL (ref 3.5–5.2)
ALP BLD-CCNC: 62 U/L (ref 40–129)
ALT SERPL-CCNC: 13 U/L (ref 0–40)
ANION GAP SERPL CALCULATED.3IONS-SCNC: 12 MMOL/L (ref 7–16)
AST SERPL-CCNC: 19 U/L (ref 0–39)
BILIRUB SERPL-MCNC: 0.5 MG/DL (ref 0–1.2)
BUN BLDV-MCNC: 48 MG/DL (ref 8–23)
CALCIUM SERPL-MCNC: 8.7 MG/DL (ref 8.6–10.2)
CHLORIDE BLD-SCNC: 99 MMOL/L (ref 98–107)
CO2: 24 MMOL/L (ref 22–29)
CREAT SERPL-MCNC: 2.4 MG/DL (ref 0.7–1.2)
GFR AFRICAN AMERICAN: 31
GFR NON-AFRICAN AMERICAN: 25 ML/MIN/1.73
GLUCOSE BLD-MCNC: 114 MG/DL (ref 74–109)
HCT VFR BLD CALC: 27.3 % (ref 37–54)
HEMOGLOBIN: 9 G/DL (ref 12.5–16.5)
MAGNESIUM: 2.3 MG/DL (ref 1.6–2.6)
MCH RBC QN AUTO: 30.7 PG (ref 26–35)
MCHC RBC AUTO-ENTMCNC: 33 % (ref 32–34.5)
MCV RBC AUTO: 93.2 FL (ref 80–99.9)
PDW BLD-RTO: 13.7 FL (ref 11.5–15)
PHOSPHORUS: 3.7 MG/DL (ref 2.5–4.5)
PLATELET # BLD: 165 E9/L (ref 130–450)
PMV BLD AUTO: 10.7 FL (ref 7–12)
POTASSIUM SERPL-SCNC: 3.5 MMOL/L (ref 3.5–5)
RBC # BLD: 2.93 E12/L (ref 3.8–5.8)
SODIUM BLD-SCNC: 135 MMOL/L (ref 132–146)
TOTAL PROTEIN: 6.1 G/DL (ref 6.4–8.3)
WBC # BLD: 6.7 E9/L (ref 4.5–11.5)

## 2018-10-04 PROCEDURE — 6370000000 HC RX 637 (ALT 250 FOR IP): Performed by: INTERNAL MEDICINE

## 2018-10-04 PROCEDURE — 80053 COMPREHEN METABOLIC PANEL: CPT

## 2018-10-04 PROCEDURE — 6360000002 HC RX W HCPCS: Performed by: INTERNAL MEDICINE

## 2018-10-04 PROCEDURE — 99233 SBSQ HOSP IP/OBS HIGH 50: CPT | Performed by: INTERNAL MEDICINE

## 2018-10-04 PROCEDURE — 84100 ASSAY OF PHOSPHORUS: CPT

## 2018-10-04 PROCEDURE — 83735 ASSAY OF MAGNESIUM: CPT

## 2018-10-04 PROCEDURE — 2060000000 HC ICU INTERMEDIATE R&B

## 2018-10-04 PROCEDURE — 85027 COMPLETE CBC AUTOMATED: CPT

## 2018-10-04 PROCEDURE — 84165 PROTEIN E-PHORESIS SERUM: CPT

## 2018-10-04 PROCEDURE — 36415 COLL VENOUS BLD VENIPUNCTURE: CPT

## 2018-10-04 PROCEDURE — 97530 THERAPEUTIC ACTIVITIES: CPT

## 2018-10-04 PROCEDURE — 2700000000 HC OXYGEN THERAPY PER DAY

## 2018-10-04 PROCEDURE — 2580000003 HC RX 258: Performed by: INTERNAL MEDICINE

## 2018-10-04 RX ORDER — POTASSIUM CHLORIDE 20 MEQ/1
40 TABLET, EXTENDED RELEASE ORAL 2 TIMES DAILY WITH MEALS
Status: COMPLETED | OUTPATIENT
Start: 2018-10-04 | End: 2018-10-04

## 2018-10-04 RX ORDER — ISOSORBIDE MONONITRATE 30 MG/1
30 TABLET, EXTENDED RELEASE ORAL DAILY
Status: DISCONTINUED | OUTPATIENT
Start: 2018-10-04 | End: 2018-10-05 | Stop reason: HOSPADM

## 2018-10-04 RX ORDER — SODIUM BICARBONATE 650 MG/1
650 TABLET ORAL 2 TIMES DAILY
Status: DISCONTINUED | OUTPATIENT
Start: 2018-10-04 | End: 2018-10-05 | Stop reason: HOSPADM

## 2018-10-04 RX ADMIN — FUROSEMIDE 40 MG: 10 INJECTION, SOLUTION INTRAMUSCULAR; INTRAVENOUS at 17:36

## 2018-10-04 RX ADMIN — HYDRALAZINE HYDROCHLORIDE 100 MG: 50 TABLET, FILM COATED ORAL at 15:31

## 2018-10-04 RX ADMIN — ASPIRIN 81 MG: 81 TABLET, CHEWABLE ORAL at 09:44

## 2018-10-04 RX ADMIN — HYDRALAZINE HYDROCHLORIDE 100 MG: 50 TABLET, FILM COATED ORAL at 11:45

## 2018-10-04 RX ADMIN — SODIUM BICARBONATE 650 MG: 650 TABLET ORAL at 20:01

## 2018-10-04 RX ADMIN — Medication 10 ML: at 09:47

## 2018-10-04 RX ADMIN — ISOSORBIDE MONONITRATE 30 MG: 30 TABLET, EXTENDED RELEASE ORAL at 09:44

## 2018-10-04 RX ADMIN — Medication 10 ML: at 17:40

## 2018-10-04 RX ADMIN — POTASSIUM CHLORIDE 40 MEQ: 20 TABLET, EXTENDED RELEASE ORAL at 16:40

## 2018-10-04 RX ADMIN — VITAMIN D, TAB 1000IU (100/BT) 2000 UNITS: 25 TAB at 09:44

## 2018-10-04 RX ADMIN — FERROUS SULFATE TAB 325 MG (65 MG ELEMENTAL FE) 325 MG: 325 (65 FE) TAB at 09:44

## 2018-10-04 RX ADMIN — ENOXAPARIN SODIUM 30 MG: 30 INJECTION SUBCUTANEOUS at 09:43

## 2018-10-04 RX ADMIN — FERROUS SULFATE TAB 325 MG (65 MG ELEMENTAL FE) 325 MG: 325 (65 FE) TAB at 16:40

## 2018-10-04 RX ADMIN — SODIUM BICARBONATE 650 MG: 650 TABLET ORAL at 13:50

## 2018-10-04 RX ADMIN — DARBEPOETIN ALFA 60 MCG: 60 INJECTION, SOLUTION INTRAVENOUS; SUBCUTANEOUS at 15:23

## 2018-10-04 RX ADMIN — SODIUM BICARBONATE 650 MG: 650 TABLET ORAL at 09:44

## 2018-10-04 RX ADMIN — POTASSIUM CHLORIDE 40 MEQ: 20 TABLET, EXTENDED RELEASE ORAL at 11:45

## 2018-10-04 RX ADMIN — HYDRALAZINE HYDROCHLORIDE 100 MG: 50 TABLET, FILM COATED ORAL at 20:02

## 2018-10-04 RX ADMIN — AMLODIPINE BESYLATE 5 MG: 5 TABLET ORAL at 20:01

## 2018-10-04 RX ADMIN — Medication 10 ML: at 20:00

## 2018-10-04 RX ADMIN — PETROLATUM: 42 OINTMENT TOPICAL at 09:52

## 2018-10-04 RX ADMIN — FUROSEMIDE 40 MG: 10 INJECTION, SOLUTION INTRAMUSCULAR; INTRAVENOUS at 09:43

## 2018-10-04 ASSESSMENT — PAIN SCALES - GENERAL
PAINLEVEL_OUTOF10: 0
PAINLEVEL_OUTOF10: 3

## 2018-10-04 ASSESSMENT — PAIN DESCRIPTION - ORIENTATION: ORIENTATION: LOWER

## 2018-10-04 ASSESSMENT — PAIN DESCRIPTION - DESCRIPTORS: DESCRIPTORS: ACHING

## 2018-10-04 ASSESSMENT — PAIN DESCRIPTION - LOCATION: LOCATION: BACK

## 2018-10-04 NOTE — PROGRESS NOTES
0320  10/04/18   0257   GLUCOSE  135*  117*  114*         Last 3 POC Glucose:     No results for input(s): POCGLU in the last 72 hours. Last 3 CK, CKMB, Troponin  No results for input(s): CKTOTAL, CKMB, TROPONINI in the last 72 hours. Last 3 CBC:  Recent Labs      10/02/18   1250  10/03/18   0320  10/04/18   0257   WBC  8.4  6.5  6.7   RBC  3.12*  2.92*  2.93*   HGB  9.6*  8.9*  9.0*   HCT  29.1*  27.1*  27.3*   MCV  93.3  92.8  93.2   MCH  30.8  30.5  30.7   MCHC  33.0  32.8  33.0   RDW  14.2  14.0  13.7   PLT  169  169  165   MPV  10.3  10.8  10.7       Last 3 Hepatic Function Panel:    Recent Labs      10/02/18   1250  10/03/18   0320  10/04/18   0257   ALKPHOS  69  63  62   ALT  17  14  13   AST  20  22  19   PROT  6.8  6.3*  6.1*   BILITOT  0.7  0.7  0.5   LABALBU  3.8  3.4*  3.2*       Albumin:  Recent Labs      10/04/18   0257   LABALBU  3.2*       Calcium:  Recent Labs      10/04/18   0257   CALCIUM  8.7       Ionized Calcium:  No results for input(s): IONCA in the last 72 hours. Magnesium:    Recent Labs      10/04/18   0257   MG  2.3         ABGs:  No results for input(s): PHART, PO2ART, UZR9MZL, NVH9SKA, BEART, M9LVTVRY in the last 72 hours. Lactic Acid:  No results for input(s): LACTA in the last 72 hours. Last 3 Amylase:  No results for input(s): AMYLASE in the last 72 hours. Last 3 Lipase:  No results for input(s): LIPASE in the last 72 hours. Last 3 BNP:  No results for input(s): BNP in the last 72 hours. XR CHEST PORTABLE [[id number]] Resulted: 10/02/18 1510      Order Status: Completed Updated: 10/02/18 1512     Narrative:       Patient MRN:  [id number]  : 1927  Age: 80 years  Gender: Male    Order Date:  10/2/2018 2:00 PM    EXAM: XR CHEST PORTABLE 1 view    INDICATION:  sob   sob     COMPARISON: 2009    FINDINGS:  There is cardiomegaly. There is mild vascular congestion. There is  minimal atelectasis and pleural effusions in the lung bases.  Dilated  bowel loops are identified. Degenerative changes are noted in the  shoulders.     Impression:       Mild CHF. 10/2/2018 8:30 PM       EXAM: US RETROPERITONEAL COMPLETE       NUMBER OF IMAGES:  41       INDICATION:  RENAL FAILURE, ACUTE (KIDNEY INJURY)         COMPARISON: August 7, 2018       TECHNIQUE:   Aldean Fiddler scale as well as color and spectral duplex sonography of the   retroperitoneum was performed.       FINDINGS:           Bilateral kidneys demonstrate normal cortical thickness and   architecture with no hydronephrosis or echogenic calculi. The right   kidney measures 9.4 x 5.0 x 5.2 cm, and left kidney measures 9.0 x 4.9   x 5.5 cm. Nearly decompressed bladder is grossly unremarkable. Abdominal aorta and IVC were not seen.           Impression       1. Unremarkable sonographic examination of the kidneys with no   hydronephrosis seen.       2. Nonvisualization of the abdominal aorta and IVC.            Assessment/Plan      1 Acute kidney injury on chronic kidney disease stage III  Baseline Creatinine 1.6 - 1.8, as elevated as high as 2.9 on 8-23; Cr 2.2 on admission  CKD risk factors include HTN, age  Urinalysis with trace protein, no RBCs; spot collection with 1.7 grams of proteinuria  Agudelo in place  and renal US unremarkable see above  Renal function stable, close to baseline -will have [place] [phone] see for D/C and monitoring of labs wt and BP    2 Volume overload  ECHO from March 2018 with EF 55%  Quantify proteinuria - 1.7 grams  UO 3.1L on the furosemide 40mg IV bid  Check CXR  and transition to oral diuretics in AM in  anticipation of discharge      3 HTN with CKD I-IV  Blood pressure above goal in the setting of the vol overload  Continue the diuresis      4 Metabolic acidosis  With CKD  Resolved with  sodium bicarbonate and the loop diuretic-will decrease the dose of the NaHCO3    5 Anemia in CKD   Hgb low but stable  Denies blood loss  Iron deficiency-ferritin 270 with an iron sat 15% on oral Fe++  Recent urine

## 2018-10-05 ENCOUNTER — APPOINTMENT (OUTPATIENT)
Dept: GENERAL RADIOLOGY | Age: 83
DRG: 291 | End: 2018-10-05
Attending: INTERNAL MEDICINE
Payer: MEDICARE

## 2018-10-05 VITALS
WEIGHT: 219.1 LBS | DIASTOLIC BLOOD PRESSURE: 50 MMHG | HEIGHT: 69 IN | TEMPERATURE: 98.2 F | SYSTOLIC BLOOD PRESSURE: 148 MMHG | BODY MASS INDEX: 32.45 KG/M2 | RESPIRATION RATE: 16 BRPM | OXYGEN SATURATION: 93 % | HEART RATE: 57 BPM

## 2018-10-05 LAB
ALBUMIN SERPL-MCNC: 3 G/DL (ref 3.5–4.7)
ALBUMIN SERPL-MCNC: 3.6 G/DL (ref 3.5–5.2)
ALP BLD-CCNC: 71 U/L (ref 40–129)
ALPHA-1-GLOBULIN: 0.3 G/DL (ref 0.2–0.4)
ALPHA-2-GLOBULIN: 0.7 G/FL (ref 0.5–1)
ALT SERPL-CCNC: 11 U/L (ref 0–40)
ANION GAP SERPL CALCULATED.3IONS-SCNC: 12 MMOL/L (ref 7–16)
AST SERPL-CCNC: 20 U/L (ref 0–39)
BETA GLOBULIN: 0.8 G/DL (ref 0.8–1.3)
BILIRUB SERPL-MCNC: 0.7 MG/DL (ref 0–1.2)
BUN BLDV-MCNC: 45 MG/DL (ref 8–23)
CALCIUM SERPL-MCNC: 8.8 MG/DL (ref 8.6–10.2)
CHLORIDE BLD-SCNC: 101 MMOL/L (ref 98–107)
CO2: 23 MMOL/L (ref 22–29)
CREAT SERPL-MCNC: 2.2 MG/DL (ref 0.7–1.2)
ELECTROPHORESIS: ABNORMAL
GAMMA GLOBULIN: 1 G/DL (ref 0.7–1.6)
GFR AFRICAN AMERICAN: 34
GFR NON-AFRICAN AMERICAN: 28 ML/MIN/1.73
GLUCOSE BLD-MCNC: 160 MG/DL (ref 74–109)
HCT VFR BLD CALC: 28.9 % (ref 37–54)
HEMOGLOBIN: 9.7 G/DL (ref 12.5–16.5)
MAGNESIUM: 2.2 MG/DL (ref 1.6–2.6)
MCH RBC QN AUTO: 31.3 PG (ref 26–35)
MCHC RBC AUTO-ENTMCNC: 33.6 % (ref 32–34.5)
MCV RBC AUTO: 93.2 FL (ref 80–99.9)
PDW BLD-RTO: 13.8 FL (ref 11.5–15)
PHOSPHORUS: 2.9 MG/DL (ref 2.5–4.5)
PLATELET # BLD: 192 E9/L (ref 130–450)
PMV BLD AUTO: 10.7 FL (ref 7–12)
POTASSIUM SERPL-SCNC: 3.6 MMOL/L (ref 3.5–5)
RBC # BLD: 3.1 E12/L (ref 3.8–5.8)
SODIUM BLD-SCNC: 136 MMOL/L (ref 132–146)
TOTAL PROTEIN: 6.5 G/DL (ref 6.4–8.3)
WBC # BLD: 8.2 E9/L (ref 4.5–11.5)

## 2018-10-05 PROCEDURE — 36415 COLL VENOUS BLD VENIPUNCTURE: CPT

## 2018-10-05 PROCEDURE — 6370000000 HC RX 637 (ALT 250 FOR IP): Performed by: INTERNAL MEDICINE

## 2018-10-05 PROCEDURE — 83735 ASSAY OF MAGNESIUM: CPT

## 2018-10-05 PROCEDURE — 6360000002 HC RX W HCPCS: Performed by: INTERNAL MEDICINE

## 2018-10-05 PROCEDURE — 2700000000 HC OXYGEN THERAPY PER DAY

## 2018-10-05 PROCEDURE — 85027 COMPLETE CBC AUTOMATED: CPT

## 2018-10-05 PROCEDURE — 2580000003 HC RX 258: Performed by: INTERNAL MEDICINE

## 2018-10-05 PROCEDURE — 97530 THERAPEUTIC ACTIVITIES: CPT

## 2018-10-05 PROCEDURE — 93010 ELECTROCARDIOGRAM REPORT: CPT | Performed by: INTERNAL MEDICINE

## 2018-10-05 PROCEDURE — 84100 ASSAY OF PHOSPHORUS: CPT

## 2018-10-05 PROCEDURE — 99232 SBSQ HOSP IP/OBS MODERATE 35: CPT | Performed by: INTERNAL MEDICINE

## 2018-10-05 PROCEDURE — 80053 COMPREHEN METABOLIC PANEL: CPT

## 2018-10-05 PROCEDURE — 71045 X-RAY EXAM CHEST 1 VIEW: CPT

## 2018-10-05 RX ORDER — FUROSEMIDE 40 MG/1
40 TABLET ORAL 2 TIMES DAILY
Status: DISCONTINUED | OUTPATIENT
Start: 2018-10-05 | End: 2018-10-05 | Stop reason: HOSPADM

## 2018-10-05 RX ORDER — ACETAMINOPHEN 325 MG/1
650 TABLET ORAL EVERY 8 HOURS PRN
Status: DISCONTINUED | OUTPATIENT
Start: 2018-10-05 | End: 2018-10-05 | Stop reason: HOSPADM

## 2018-10-05 RX ORDER — ISOSORBIDE MONONITRATE 30 MG/1
30 TABLET, EXTENDED RELEASE ORAL DAILY
Qty: 30 TABLET | Refills: 11 | Status: SHIPPED | OUTPATIENT
Start: 2018-10-05

## 2018-10-05 RX ADMIN — FERROUS SULFATE TAB 325 MG (65 MG ELEMENTAL FE) 325 MG: 325 (65 FE) TAB at 08:55

## 2018-10-05 RX ADMIN — Medication 10 ML: at 08:55

## 2018-10-05 RX ADMIN — ENOXAPARIN SODIUM 30 MG: 30 INJECTION SUBCUTANEOUS at 08:55

## 2018-10-05 RX ADMIN — ASPIRIN 81 MG: 81 TABLET, CHEWABLE ORAL at 08:55

## 2018-10-05 RX ADMIN — VITAMIN D, TAB 1000IU (100/BT) 2000 UNITS: 25 TAB at 06:54

## 2018-10-05 RX ADMIN — PETROLATUM: 42 OINTMENT TOPICAL at 08:56

## 2018-10-05 RX ADMIN — HYDRALAZINE HYDROCHLORIDE 100 MG: 50 TABLET, FILM COATED ORAL at 08:55

## 2018-10-05 RX ADMIN — FUROSEMIDE 40 MG: 10 INJECTION, SOLUTION INTRAMUSCULAR; INTRAVENOUS at 08:55

## 2018-10-05 RX ADMIN — ACETAMINOPHEN 650 MG: 325 TABLET ORAL at 06:54

## 2018-10-05 RX ADMIN — ISOSORBIDE MONONITRATE 30 MG: 30 TABLET, EXTENDED RELEASE ORAL at 08:55

## 2018-10-05 RX ADMIN — SODIUM BICARBONATE 650 MG: 650 TABLET ORAL at 08:56

## 2018-10-05 ASSESSMENT — PAIN DESCRIPTION - DESCRIPTORS: DESCRIPTORS: ACHING

## 2018-10-05 ASSESSMENT — PAIN SCALES - GENERAL: PAINLEVEL_OUTOF10: 2

## 2018-10-05 ASSESSMENT — PAIN DESCRIPTION - LOCATION: LOCATION: BACK

## 2018-10-05 ASSESSMENT — PAIN DESCRIPTION - ORIENTATION: ORIENTATION: LOWER

## 2018-10-05 NOTE — PROGRESS NOTES
use and agreeable as well as wife stating he \"has trouble when his knee hurts\" educated pt and wife on using ww when having increased pain and/or unsteadiness. Pt was left upright in a recliner chair with call light in reach    Chair/bed alarm: chair alarm active    Time in: 0914  Time out: 0926    Pt is making good progress toward established Physical Therapy goals as per increased functional mobility performed. Continue with physical therapy current plan of care.     Heather Martinez PTA [id number]

## 2018-10-05 NOTE — PROGRESS NOTES
INPATIENT CARDIOLOGY FOLLOW-UP    Name: Marjan Sutton    Age: 80 y.o. Date of Admission: 10/2/2018 10:40 AM    Date of Service: 10/5/2018    Chief Complaint: Follow-up for HFpEF    Interim History:  He presented on 10/2/18 for further evaluation of progressive volume overload, SOB, and LE edema x 1 week. No chest pain. Respiratory status improving with ongoing diuresis (I/O's net negative 7.8 L). No new overnight cardiac complaints.     Review of Systems:   Cardiac: As per HPI  General: No fever, chills  Pulmonary: As per HPI  HEENT: No visual disturbances, difficult swallowing  GI: No nausea, vomiting  Musculoskeletal: RAYGOZA x 4, no focal motor deficits  Skin: +LE stasis dermatitis changes/erythema  Neuro/Psych: No headache or seizures    Problem List:  Patient Active Problem List   Diagnosis    Essential hypertension, benign    Venous stasis dermatitis of both lower extremities    Sinus bradycardia    Acute diastolic (congestive) heart failure (HCC)    Stage 4 chronic kidney disease ([facility] [address]    Non-rheumatic mitral regurgitation    Chronic anemia       Allergies:  No Known Allergies    Current Medications:  Current Facility-Administered Medications   Medication Dose Route Frequency Provider Last Rate Last Dose    acetaminophen (TYLENOL) tablet 650 mg  650 mg Oral Q8H PRN Yuri Pinto MD   650 mg at 10/05/18 0654    isosorbide mononitrate (IMDUR) extended release tablet 30 mg  30 mg Oral Daily Rose Crow MD   30 mg at 10/05/18 0855    sodium bicarbonate tablet 650 mg  650 mg Oral BID Yulia Corado MD   650 mg at 10/05/18 8725    darbepoetin freddie-polysorbate (ARANESP) injection 60 mcg  60 mcg Subcutaneous Weekly Yulia Corado MD   60 mcg at 10/04/18 1523    ferrous sulfate tablet 325 mg  325 mg Oral BID WC Renée Woods MD   325 mg at 10/05/18 0855    furosemide (LASIX) injection 40 mg  40 mg Intravenous BID Renée Woods MD   40 mg at 10/05/18 0855    amLODIPine (NORVASC) tablet 5 mg  5 mg Oral Nightly Susan Villalba MD   5 mg at 10/04/18 2001    aspirin chewable tablet 81 mg  81 mg Oral Daily Mehran Snell MD   81 mg at 10/05/18 5465    vitamin D (CHOLECALCIFEROL) tablet 2,000 Units  2,000 Units Oral Daily Mehran Snell MD   2,000 Units at 10/05/18 0654    hydrALAZINE (APRESOLINE) tablet 100 mg  100 mg Oral TID Mehran Snell MD   100 mg at 10/05/18 0855    sodium chloride flush 0.9 % injection 10 mL  10 mL Intravenous 2 times per day Mehran Snell MD   10 mL at 10/05/18 0855    sodium chloride flush 0.9 % injection 10 mL  10 mL Intravenous PRN Mehran Snell MD   10 mL at 10/04/18 1740    magnesium hydroxide (MILK OF MAGNESIA) 400 MG/5ML suspension 30 mL  30 mL Oral Daily PRN Mehran Snell MD        ondansetron [facility]) injection 4 mg  4 mg Intravenous Q6H PRN Mehran Snell MD        enoxaparin (LOVENOX) injection 30 mg  30 mg Subcutaneous Daily Mehran Snell MD   30 mg at 10/05/18 8017    mineral oil-hydrophilic petrolatum (AQUAPHOR) ointment   Topical Daily Mehran Snell MD             Physical Exam:  BP (!) 148/50 Comment: manual  Pulse 57   Temp 98.2 °F (36.8 °C) (Oral)   Resp 16   Ht 5' 9\" (1.753 m)   Wt 219 lb 1.6 oz (99.4 kg)   SpO2 93%   BMI 32.36 kg/m²   Wt Readings from Last 3 Encounters:   10/05/18 219 lb 1.6 oz (99.4 kg)   08/10/18 205 lb 9.6 oz (93.3 kg)   03/27/18 201 lb (91.2 kg)     Appearance: Awake, no acute respiratory distress  Skin: +LE stasis dermatitis changes/erythema  Head: Normocephalic, atraumatic  Eyes: EOMI, no conjunctival erythema  ENMT: MMM, no rhinorrhea  Neck: Supple, no carotid bruits  Lungs: Decreased BS B/L, no wheezing  Cardiac: Regular rate and rhythm, 2/6 systolic murmur  Abdomen: Soft, +bowel sounds  Extremities: Moves all extremities x 4, +B/L lower extremity edema, LE's wrapped  Neurologic: No focal motor deficits apparent, normal mood and I/O's net negative 7.8 L thus far  2. HFpEF  3. Acute on CKD (Cr 2.2 on admission --> 2.2 --> 2.4 --> 2.2) -- renal following  4. HTN -- BP elevated on admission, on norvasc and hydralazine (recent 's-160's)  5. Mild MR/AI  6. Anemia -- most recent Hgb 9.6 --> 8.9 --> 9.0 --> 9.7     Plan:  1. Monitor renal function and electrolytes closely with ongoing diuresis (per renal)  2. Continue to up-titrate/adjust anti-HTN regimen as needed  3. Replace K PRN  4. Prior cardiac testing reviewed (3/2018 echocardiogram)  5. Case discussed with the patient's wife today  6.  No further inpatient cardiac work-up planned     Samreen Madera MD  [facility]) Cardiology

## 2018-10-09 NOTE — DISCHARGE SUMMARY
fairly stable with the significant  diuresis, and his creatinine at discharge was about 2.4. He has a  chronic anemia and this was stable throughout the admission with  hemoglobin around 9.0. He does have significant hypertension, which  certainly may have contributed to recurrence of his diastolic heart  failure. His systolic blood pressures were in the 170s on admission. By the time of discharge, it was in the 140s to 160s. He is on  amlodipine and hydralazine, and Imdur was added. Also, he will be  placed on a dose of an oral diuretic at the time of discharge by  Nephrology. He was previously on lisinopril/HCT and carvedilol with  excellent blood pressure control. However, we had to stop his  carvedilol due to bradycardia issues and discontinued his lisinopril  and HCT due to worsening of his renal failure. On the day of  discharge, the patient was feeling much better and ambulating in the  hallway. As noted, he will not need any home-going oxygen therapy. I  will discharge him home with home PT and nursing care for continued  CHF education and medication compliance. DISCHARGE MEDICATIONS:  Are as follows:  Aspirin 81 mg daily;  hydralazine 100 mg t.i.d.; Imdur 30 mg daily, which is a new script;  amlodipine 5 mg daily; and vitamin D3 at 2000 units daily. He will be  started on a dose of an oral diuretic as well per Nephrology. He will  need a close followup with Cardiology and Nephrology. He will need a  repeat BMP to assess his renal function in the near future. I will  see him in my office in approximately one week for a transitional care  visit. Mariajose Denise MD    D: 10/05/2018 16:09:42       T: 10/06/2018 2:37:24     [email or web address]  Job#: [id number]     Doc#: [id number]    CC:   Beckie Buckley MD

## 2018-10-10 ENCOUNTER — TELEPHONE (OUTPATIENT)
Dept: CARDIOLOGY CLINIC | Age: 83
End: 2018-10-10

## 2018-12-05 ENCOUNTER — OFFICE VISIT (OUTPATIENT)
Dept: CARDIOLOGY CLINIC | Age: 83
End: 2018-12-05
Payer: MEDICARE

## 2018-12-05 VITALS
RESPIRATION RATE: 16 BRPM | SYSTOLIC BLOOD PRESSURE: 134 MMHG | DIASTOLIC BLOOD PRESSURE: 74 MMHG | HEART RATE: 64 BPM | WEIGHT: 196 LBS | BODY MASS INDEX: 29.03 KG/M2 | HEIGHT: 69 IN

## 2018-12-05 DIAGNOSIS — I50.31 ACUTE DIASTOLIC (CONGESTIVE) HEART FAILURE (HCC): Primary | ICD-10-CM

## 2018-12-05 PROCEDURE — 93000 ELECTROCARDIOGRAM COMPLETE: CPT | Performed by: INTERNAL MEDICINE

## 2018-12-05 PROCEDURE — 99213 OFFICE O/P EST LOW 20 MIN: CPT | Performed by: INTERNAL MEDICINE

## 2018-12-05 RX ORDER — PETROLATUM 42 G/100G
OINTMENT TOPICAL PRN
COMMUNITY

## 2018-12-05 RX ORDER — HYDRALAZINE HYDROCHLORIDE 100 MG/1
TABLET, FILM COATED ORAL
Refills: 5 | Status: ON HOLD | COMMUNITY
Start: 2018-11-19 | End: 2019-01-23 | Stop reason: HOSPADM

## 2018-12-06 NOTE — PROGRESS NOTES
Chief Complaint   Patient presents with    Bradycardia       Patient Active Problem List    Diagnosis Date Noted    Chronic anemia 10/03/2018    Stage 4 chronic kidney disease ([facility] [address] 10/02/2018    Sinus bradycardia 03/27/2018    Essential hypertension, benign 06/12/2017    Venous stasis dermatitis of both lower extremities 06/12/2017       Current Outpatient Prescriptions   Medication Sig Dispense Refill    hydrALAZINE (APRESOLINE) 100 MG tablet TAKE ONE TABLET BY MOUTH THREE TIMES A DAY  5    mineral oil-hydrophilic petrolatum (HYDROPHOR) ointment Apply topically as needed for Dry Skin Apply topically as needed.  isosorbide mononitrate (IMDUR) 30 MG extended release tablet Take 1 tablet by mouth daily 30 tablet 11    aspirin 81 MG chewable tablet Take 1 tablet by mouth daily 30 tablet 3    amLODIPine (NORVASC) 5 MG tablet Take 0.5 tablets by mouth nightly 30 tablet 3    Multiple Vitamin (MULTI VITAMIN DAILY PO) Take 1 tablet by mouth Daily      Cholecalciferol (VITAMIN D) 2000 UNITS CAPS capsule Take 2,000 Units by mouth Daily      acetaminophen (TYLENOL) 325 MG tablet Take 650 mg by mouth every 6 hours as needed for Pain      hydrALAZINE (APRESOLINE) 100 MG tablet Take 1 tablet by mouth 3 times daily 90 tablet 5    fluocinonide (LIDEX) 0.05 % cream Apply topically 2 times daily Apply topically 2 times daily. No current facility-administered medications for this visit. No Known Allergies    Vitals:    12/05/18 1353   BP: 134/74   Pulse: 64   Resp: 16   Weight: 196 lb (88.9 kg)   Height: 5' 9\" (1.753 m)       Social History     Social History    Marital status:      Spouse name: N/A    Number of children: N/A    Years of education: N/A     Occupational History    Not on file.      Social History Main Topics    Smoking status: Never Smoker    Smokeless tobacco: Never Used    Alcohol use 1.8 oz/week     3 Glasses of wine per week      Comment: 1 very dry martini before dinner    Drug use: No    Sexual activity: Not on file     Other Topics Concern    Not on file     Social History Narrative    No narrative on file       History reviewed. No pertinent family history. SUBJECTIVE: Augusto Torres presents to the office today for hfu He complains of no cardiac issues and denies   chest pain, chest pressure/discomfort, claudication, dyspnea, exertional chest pressure/discomfort, fatigue, irregular heart beat, lower extremity edema, near-syncope, orthopnea, palpitations, paroxysmal nocturnal dyspnea, syncope and tachypnea. Recently in hospital. I reviwed records    Review of Systems:   Heart: as above   Lungs: as above   Eyes: denies changes in vision or discharge. Ears: denies changes in hearing or pain. Nose: denies epistaxis or masses   Throat: denies sore throat or trouble swallowing. Neuro: denies numbness, tingling, tremors. Skin: denies rashes or itching. : denies hematuria, dysuria   GI: denies vomiting, diarrhea   Psych: denies mood changed, anxiety, depression. all others negative. Physical Exam   /74   Pulse 64   Resp 16   Ht 5' 9\" (1.753 m)   Wt 196 lb (88.9 kg)   BMI 28.94 kg/m²   Constitutional: Oriented to person, place, and time. Well-developed and well-nourished. No distress. Head: Normocephalic and atraumatic. Eyes: EOM are normal. Pupils are equal, round, and reactive to light. Neck: Normal range of motion. Neck supple. No hepatojugular reflux and no JVD present. Carotid bruit is not present. No tracheal deviation present. No thyromegaly present. Cardiovascular: Normal rate, regular rhythm, normal heart sounds and intact distal pulses. Exam reveals no gallop and no friction rub. Grade II/VI  murmur heard. Pulmonary/Chest: Effort normal and breath sounds normal. No respiratory distress. No wheezes. No rales. No tenderness. Abdominal: Soft. Bowel sounds are normal. No distension and no mass. No tenderness.  No rebound and no guarding. Musculoskeletal: Normal range of motion. edema   Lymphadenopathy:   No cervical adenopathy. No groin adenopathy. Neurological: Alert and oriented to person, place, and time. Skin: Skin is warm and dry. No rash noted. Not diaphoretic. No erythema. Psychiatric: Normal mood and affect. Behavior is normal.     EKG:  normal sinus rhythm, non conducted PAC's noted, there are no previous tracings available for comparison.     ASSESSMENT AND PLAN:  Patient Active Problem List   Diagnosis    Essential hypertension, benign    Venous stasis dermatitis of both lower extremities    Sinus bradycardia    Stage 4 chronic kidney disease (HCC)    Chronic anemia     Asymptomatic,  ekg no change  No pacemaker needed  Ov tatum Castañeda M.D  [facility] Cardiology

## 2019-01-18 ENCOUNTER — HOSPITAL ENCOUNTER (INPATIENT)
Age: 84
LOS: 6 days | Discharge: OTHER INSTITUTION | DRG: 291 | End: 2019-01-24
Attending: INTERNAL MEDICINE | Admitting: INTERNAL MEDICINE
Payer: MEDICARE

## 2019-01-18 ENCOUNTER — APPOINTMENT (OUTPATIENT)
Dept: GENERAL RADIOLOGY | Age: 84
DRG: 291 | End: 2019-01-18
Attending: INTERNAL MEDICINE
Payer: MEDICARE

## 2019-01-18 PROBLEM — I50.33 ACUTE ON CHRONIC DIASTOLIC HEART FAILURE (HCC): Status: ACTIVE | Noted: 2019-01-18

## 2019-01-18 LAB
ALBUMIN SERPL-MCNC: 3.1 G/DL (ref 3.5–5.2)
ALP BLD-CCNC: 74 U/L (ref 40–129)
ALT SERPL-CCNC: 17 U/L (ref 0–40)
ANION GAP SERPL CALCULATED.3IONS-SCNC: 13 MMOL/L (ref 7–16)
AST SERPL-CCNC: 20 U/L (ref 0–39)
BACTERIA: ABNORMAL /HPF
BASOPHILS ABSOLUTE: 0.04 E9/L (ref 0–0.2)
BASOPHILS RELATIVE PERCENT: 0.4 % (ref 0–2)
BILIRUB SERPL-MCNC: 0.4 MG/DL (ref 0–1.2)
BILIRUBIN URINE: NEGATIVE
BLOOD, URINE: NEGATIVE
BUN BLDV-MCNC: 70 MG/DL (ref 8–23)
CALCIUM SERPL-MCNC: 8.3 MG/DL (ref 8.6–10.2)
CHLORIDE BLD-SCNC: 106 MMOL/L (ref 98–107)
CLARITY: CLEAR
CO2: 17 MMOL/L (ref 22–29)
COLOR: YELLOW
CREAT SERPL-MCNC: 3 MG/DL (ref 0.7–1.2)
CREATININE URINE: 91 MG/DL (ref 40–278)
EOSINOPHILS ABSOLUTE: 0.88 E9/L (ref 0.05–0.5)
EOSINOPHILS RELATIVE PERCENT: 8.2 % (ref 0–6)
EPITHELIAL CELLS, UA: ABNORMAL /HPF
GFR AFRICAN AMERICAN: 24
GFR NON-AFRICAN AMERICAN: 20 ML/MIN/1.73
GLUCOSE BLD-MCNC: 112 MG/DL (ref 74–99)
GLUCOSE URINE: NEGATIVE MG/DL
HCT VFR BLD CALC: 29.4 % (ref 37–54)
HEMOGLOBIN: 9.7 G/DL (ref 12.5–16.5)
IMMATURE GRANULOCYTES #: 0.07 E9/L
IMMATURE GRANULOCYTES %: 0.7 % (ref 0–5)
KETONES, URINE: NEGATIVE MG/DL
LEUKOCYTE ESTERASE, URINE: ABNORMAL
LYMPHOCYTES ABSOLUTE: 0.94 E9/L (ref 1.5–4)
LYMPHOCYTES RELATIVE PERCENT: 8.7 % (ref 20–42)
MAGNESIUM: 2.4 MG/DL (ref 1.6–2.6)
MCH RBC QN AUTO: 29.7 PG (ref 26–35)
MCHC RBC AUTO-ENTMCNC: 33 % (ref 32–34.5)
MCV RBC AUTO: 89.9 FL (ref 80–99.9)
MONOCYTES ABSOLUTE: 0.53 E9/L (ref 0.1–0.95)
MONOCYTES RELATIVE PERCENT: 4.9 % (ref 2–12)
NEUTROPHILS ABSOLUTE: 8.29 E9/L (ref 1.8–7.3)
NEUTROPHILS RELATIVE PERCENT: 77.1 % (ref 43–80)
NITRITE, URINE: NEGATIVE
PDW BLD-RTO: 14.9 FL (ref 11.5–15)
PH UA: 5.5 (ref 5–9)
PLATELET # BLD: 258 E9/L (ref 130–450)
PMV BLD AUTO: 10 FL (ref 7–12)
POTASSIUM SERPL-SCNC: 5.2 MMOL/L (ref 3.5–5)
PRO-BNP: 5948 PG/ML (ref 0–450)
PROTEIN PROTEIN: 17 MG/DL (ref 0–12)
PROTEIN UA: NEGATIVE MG/DL
PROTEIN/CREAT RATIO: 0.2
PROTEIN/CREAT RATIO: 0.2 (ref 0–0.2)
RBC # BLD: 3.27 E12/L (ref 3.8–5.8)
RBC UA: ABNORMAL /HPF (ref 0–2)
SODIUM BLD-SCNC: 136 MMOL/L (ref 132–146)
SPECIFIC GRAVITY UA: 1.01 (ref 1–1.03)
TOTAL PROTEIN: 5.8 G/DL (ref 6.4–8.3)
TSH SERPL DL<=0.05 MIU/L-ACNC: 2.78 UIU/ML (ref 0.27–4.2)
UROBILINOGEN, URINE: 0.2 E.U./DL
WBC # BLD: 10.8 E9/L (ref 4.5–11.5)
WBC UA: ABNORMAL /HPF (ref 0–5)

## 2019-01-18 PROCEDURE — 71045 X-RAY EXAM CHEST 1 VIEW: CPT

## 2019-01-18 PROCEDURE — 1200000000 HC SEMI PRIVATE

## 2019-01-18 PROCEDURE — 87088 URINE BACTERIA CULTURE: CPT

## 2019-01-18 PROCEDURE — 36415 COLL VENOUS BLD VENIPUNCTURE: CPT

## 2019-01-18 PROCEDURE — 84156 ASSAY OF PROTEIN URINE: CPT

## 2019-01-18 PROCEDURE — 83880 ASSAY OF NATRIURETIC PEPTIDE: CPT

## 2019-01-18 PROCEDURE — 99223 1ST HOSP IP/OBS HIGH 75: CPT | Performed by: INTERNAL MEDICINE

## 2019-01-18 PROCEDURE — 6370000000 HC RX 637 (ALT 250 FOR IP): Performed by: INTERNAL MEDICINE

## 2019-01-18 PROCEDURE — 80053 COMPREHEN METABOLIC PANEL: CPT

## 2019-01-18 PROCEDURE — APPSS60 APP SPLIT SHARED TIME 46-60 MINUTES: Performed by: NURSE PRACTITIONER

## 2019-01-18 PROCEDURE — 84443 ASSAY THYROID STIM HORMONE: CPT

## 2019-01-18 PROCEDURE — 2580000003 HC RX 258: Performed by: INTERNAL MEDICINE

## 2019-01-18 PROCEDURE — 82570 ASSAY OF URINE CREATININE: CPT

## 2019-01-18 PROCEDURE — 83735 ASSAY OF MAGNESIUM: CPT

## 2019-01-18 PROCEDURE — 6360000002 HC RX W HCPCS: Performed by: INTERNAL MEDICINE

## 2019-01-18 PROCEDURE — 81001 URINALYSIS AUTO W/SCOPE: CPT

## 2019-01-18 PROCEDURE — 85025 COMPLETE CBC W/AUTO DIFF WBC: CPT

## 2019-01-18 PROCEDURE — 2500000003 HC RX 250 WO HCPCS: Performed by: INTERNAL MEDICINE

## 2019-01-18 RX ORDER — FUROSEMIDE 10 MG/ML
40 INJECTION INTRAMUSCULAR; INTRAVENOUS 3 TIMES DAILY
Status: DISCONTINUED | OUTPATIENT
Start: 2019-01-18 | End: 2019-01-21

## 2019-01-18 RX ORDER — AMLODIPINE BESYLATE 2.5 MG/1
2.5 TABLET ORAL NIGHTLY
Status: DISCONTINUED | OUTPATIENT
Start: 2019-01-18 | End: 2019-01-24 | Stop reason: HOSPADM

## 2019-01-18 RX ORDER — AMMONIUM LACTATE 12 G/100G
LOTION TOPICAL PRN
Status: DISCONTINUED | OUTPATIENT
Start: 2019-01-18 | End: 2019-01-24 | Stop reason: HOSPADM

## 2019-01-18 RX ORDER — PRENATAL VIT 91/IRON/FOLIC/DHA 28-975-200
COMBINATION PACKAGE (EA) ORAL 2 TIMES DAILY
Status: DISCONTINUED | OUTPATIENT
Start: 2019-01-18 | End: 2019-01-24 | Stop reason: HOSPADM

## 2019-01-18 RX ORDER — TERBINAFINE HYDROCHLORIDE 250 MG/1
250 TABLET ORAL DAILY
Status: DISCONTINUED | OUTPATIENT
Start: 2019-01-18 | End: 2019-01-24 | Stop reason: HOSPADM

## 2019-01-18 RX ORDER — SODIUM CHLORIDE 0.9 % (FLUSH) 0.9 %
10 SYRINGE (ML) INJECTION EVERY 12 HOURS SCHEDULED
Status: DISCONTINUED | OUTPATIENT
Start: 2019-01-18 | End: 2019-01-24 | Stop reason: HOSPADM

## 2019-01-18 RX ORDER — HYDRALAZINE HYDROCHLORIDE 50 MG/1
100 TABLET, FILM COATED ORAL 3 TIMES DAILY
Status: DISCONTINUED | OUTPATIENT
Start: 2019-01-18 | End: 2019-01-24 | Stop reason: HOSPADM

## 2019-01-18 RX ORDER — ONDANSETRON 2 MG/ML
4 INJECTION INTRAMUSCULAR; INTRAVENOUS EVERY 6 HOURS PRN
Status: DISCONTINUED | OUTPATIENT
Start: 2019-01-18 | End: 2019-01-24 | Stop reason: HOSPADM

## 2019-01-18 RX ORDER — ACETAMINOPHEN 325 MG/1
650 TABLET ORAL EVERY 6 HOURS PRN
Status: DISCONTINUED | OUTPATIENT
Start: 2019-01-18 | End: 2019-01-24 | Stop reason: HOSPADM

## 2019-01-18 RX ORDER — ASPIRIN 81 MG/1
81 TABLET, CHEWABLE ORAL DAILY
Status: DISCONTINUED | OUTPATIENT
Start: 2019-01-18 | End: 2019-01-24 | Stop reason: HOSPADM

## 2019-01-18 RX ORDER — ISOSORBIDE MONONITRATE 30 MG/1
30 TABLET, EXTENDED RELEASE ORAL DAILY
Status: DISCONTINUED | OUTPATIENT
Start: 2019-01-18 | End: 2019-01-24 | Stop reason: HOSPADM

## 2019-01-18 RX ORDER — CHOLECALCIFEROL (VITAMIN D3) 50 MCG
2000 TABLET ORAL DAILY
Status: DISCONTINUED | OUTPATIENT
Start: 2019-01-18 | End: 2019-01-24 | Stop reason: HOSPADM

## 2019-01-18 RX ORDER — SODIUM CHLORIDE 0.9 % (FLUSH) 0.9 %
10 SYRINGE (ML) INJECTION PRN
Status: DISCONTINUED | OUTPATIENT
Start: 2019-01-18 | End: 2019-01-24 | Stop reason: HOSPADM

## 2019-01-18 RX ADMIN — FLUOCINONIDE: 0.5 CREAM TOPICAL at 22:11

## 2019-01-18 RX ADMIN — FUROSEMIDE 40 MG: 10 INJECTION, SOLUTION INTRAMUSCULAR; INTRAVENOUS at 16:46

## 2019-01-18 RX ADMIN — HYDRALAZINE HYDROCHLORIDE 100 MG: 50 TABLET ORAL at 16:46

## 2019-01-18 RX ADMIN — FLUOCINONIDE: 0.5 CREAM TOPICAL at 16:49

## 2019-01-18 RX ADMIN — TRIAMCINOLONE ACETONIDE: 1 OINTMENT TOPICAL at 16:49

## 2019-01-18 RX ADMIN — FUROSEMIDE 40 MG: 10 INJECTION, SOLUTION INTRAMUSCULAR; INTRAVENOUS at 22:05

## 2019-01-18 RX ADMIN — TERBINAFINE HYDROCHLORIDE: 1 CREAM TOPICAL at 16:49

## 2019-01-18 RX ADMIN — TRIAMCINOLONE ACETONIDE: 1 OINTMENT TOPICAL at 22:15

## 2019-01-18 RX ADMIN — TERBINAFINE HYDROCHLORIDE: 1 CREAM TOPICAL at 22:11

## 2019-01-18 RX ADMIN — Medication: at 16:49

## 2019-01-18 RX ADMIN — HYDRALAZINE HYDROCHLORIDE 100 MG: 50 TABLET ORAL at 22:05

## 2019-01-18 RX ADMIN — AMLODIPINE BESYLATE 2.5 MG: 2.5 TABLET ORAL at 22:05

## 2019-01-18 RX ADMIN — TERBINAFINE 250 MG: 250 TABLET ORAL at 18:11

## 2019-01-18 RX ADMIN — ASPIRIN 81 MG 81 MG: 81 TABLET ORAL at 16:48

## 2019-01-18 RX ADMIN — MICONAZOLE NITRATE: 20 POWDER TOPICAL at 22:16

## 2019-01-18 RX ADMIN — MICONAZOLE NITRATE: 20 POWDER TOPICAL at 16:49

## 2019-01-18 RX ADMIN — CHOLECALCIFEROL TAB 50 MCG (2000 UNIT) 2000 UNITS: 50 TAB at 16:45

## 2019-01-18 RX ADMIN — Medication 10 ML: at 15:13

## 2019-01-18 RX ADMIN — Medication 10 ML: at 22:06

## 2019-01-18 RX ADMIN — ISOSORBIDE MONONITRATE 30 MG: 30 TABLET, EXTENDED RELEASE ORAL at 16:45

## 2019-01-18 ASSESSMENT — PAIN SCALES - GENERAL
PAINLEVEL_OUTOF10: 0
PAINLEVEL_OUTOF10: 0

## 2019-01-19 LAB
ALBUMIN SERPL-MCNC: 2.9 G/DL (ref 3.5–5.2)
ALP BLD-CCNC: 71 U/L (ref 40–129)
ALT SERPL-CCNC: 17 U/L (ref 0–40)
ANION GAP SERPL CALCULATED.3IONS-SCNC: 12 MMOL/L (ref 7–16)
AST SERPL-CCNC: 19 U/L (ref 0–39)
BASOPHILS ABSOLUTE: 0.02 E9/L (ref 0–0.2)
BASOPHILS RELATIVE PERCENT: 0.2 % (ref 0–2)
BILIRUB SERPL-MCNC: 0.3 MG/DL (ref 0–1.2)
BUN BLDV-MCNC: 71 MG/DL (ref 8–23)
CALCIUM IONIZED: 1.23 MMOL/L (ref 1.15–1.33)
CALCIUM SERPL-MCNC: 8.3 MG/DL (ref 8.6–10.2)
CHLORIDE BLD-SCNC: 103 MMOL/L (ref 98–107)
CO2: 18 MMOL/L (ref 22–29)
CREAT SERPL-MCNC: 3 MG/DL (ref 0.7–1.2)
EOSINOPHILS ABSOLUTE: 0.92 E9/L (ref 0.05–0.5)
EOSINOPHILS RELATIVE PERCENT: 7.4 % (ref 0–6)
FERRITIN: 105 NG/ML
FOLATE: >20 NG/ML (ref 4.8–24.2)
GFR AFRICAN AMERICAN: 24
GFR NON-AFRICAN AMERICAN: 20 ML/MIN/1.73
GLUCOSE BLD-MCNC: 99 MG/DL (ref 74–99)
HCT VFR BLD CALC: 26 % (ref 37–54)
HCT VFR BLD CALC: 28.7 % (ref 37–54)
HEMOGLOBIN: 8.5 G/DL (ref 12.5–16.5)
HEMOGLOBIN: 9.4 G/DL (ref 12.5–16.5)
IMMATURE GRANULOCYTES #: 0.05 E9/L
IMMATURE GRANULOCYTES %: 0.4 % (ref 0–5)
IRON SATURATION: 12 % (ref 20–55)
IRON: 22 MCG/DL (ref 59–158)
LYMPHOCYTES ABSOLUTE: 0.76 E9/L (ref 1.5–4)
LYMPHOCYTES RELATIVE PERCENT: 6.2 % (ref 20–42)
MAGNESIUM: 2.3 MG/DL (ref 1.6–2.6)
MCH RBC QN AUTO: 29.3 PG (ref 26–35)
MCHC RBC AUTO-ENTMCNC: 32.7 % (ref 32–34.5)
MCV RBC AUTO: 89.7 FL (ref 80–99.9)
MONOCYTES ABSOLUTE: 0.49 E9/L (ref 0.1–0.95)
MONOCYTES RELATIVE PERCENT: 4 % (ref 2–12)
NEUTROPHILS ABSOLUTE: 10.11 E9/L (ref 1.8–7.3)
NEUTROPHILS RELATIVE PERCENT: 81.8 % (ref 43–80)
PARATHYROID HORMONE INTACT: 63 PG/ML (ref 15–65)
PDW BLD-RTO: 15 FL (ref 11.5–15)
PHOSPHORUS: 3.6 MG/DL (ref 2.5–4.5)
PLATELET # BLD: 233 E9/L (ref 130–450)
PMV BLD AUTO: 10.2 FL (ref 7–12)
POTASSIUM SERPL-SCNC: 4.9 MMOL/L (ref 3.5–5)
RBC # BLD: 2.9 E12/L (ref 3.8–5.8)
SODIUM BLD-SCNC: 133 MMOL/L (ref 132–146)
TOTAL IRON BINDING CAPACITY: 184 MCG/DL (ref 250–450)
TOTAL PROTEIN: 5.3 G/DL (ref 6.4–8.3)
URIC ACID, SERUM: 13.1 MG/DL (ref 3.4–7)
VITAMIN B-12: 593 PG/ML (ref 211–946)
VITAMIN D 25-HYDROXY: 28 NG/ML (ref 30–100)
WBC # BLD: 12.4 E9/L (ref 4.5–11.5)

## 2019-01-19 PROCEDURE — 83550 IRON BINDING TEST: CPT

## 2019-01-19 PROCEDURE — 6360000002 HC RX W HCPCS: Performed by: INTERNAL MEDICINE

## 2019-01-19 PROCEDURE — 1200000000 HC SEMI PRIVATE

## 2019-01-19 PROCEDURE — 6370000000 HC RX 637 (ALT 250 FOR IP): Performed by: INTERNAL MEDICINE

## 2019-01-19 PROCEDURE — 82306 VITAMIN D 25 HYDROXY: CPT

## 2019-01-19 PROCEDURE — 82728 ASSAY OF FERRITIN: CPT

## 2019-01-19 PROCEDURE — 85025 COMPLETE CBC W/AUTO DIFF WBC: CPT

## 2019-01-19 PROCEDURE — 99232 SBSQ HOSP IP/OBS MODERATE 35: CPT | Performed by: INTERNAL MEDICINE

## 2019-01-19 PROCEDURE — 97530 THERAPEUTIC ACTIVITIES: CPT

## 2019-01-19 PROCEDURE — 36415 COLL VENOUS BLD VENIPUNCTURE: CPT

## 2019-01-19 PROCEDURE — 85018 HEMOGLOBIN: CPT

## 2019-01-19 PROCEDURE — 85014 HEMATOCRIT: CPT

## 2019-01-19 PROCEDURE — 83540 ASSAY OF IRON: CPT

## 2019-01-19 PROCEDURE — 82746 ASSAY OF FOLIC ACID SERUM: CPT

## 2019-01-19 PROCEDURE — 82607 VITAMIN B-12: CPT

## 2019-01-19 PROCEDURE — 97161 PT EVAL LOW COMPLEX 20 MIN: CPT

## 2019-01-19 PROCEDURE — 84550 ASSAY OF BLOOD/URIC ACID: CPT

## 2019-01-19 PROCEDURE — 2580000003 HC RX 258: Performed by: INTERNAL MEDICINE

## 2019-01-19 PROCEDURE — 84100 ASSAY OF PHOSPHORUS: CPT

## 2019-01-19 PROCEDURE — 97116 GAIT TRAINING THERAPY: CPT

## 2019-01-19 PROCEDURE — 83970 ASSAY OF PARATHORMONE: CPT

## 2019-01-19 PROCEDURE — 51702 INSERT TEMP BLADDER CATH: CPT

## 2019-01-19 PROCEDURE — 82330 ASSAY OF CALCIUM: CPT

## 2019-01-19 PROCEDURE — 51798 US URINE CAPACITY MEASURE: CPT

## 2019-01-19 PROCEDURE — 80053 COMPREHEN METABOLIC PANEL: CPT

## 2019-01-19 PROCEDURE — 83735 ASSAY OF MAGNESIUM: CPT

## 2019-01-19 RX ORDER — CEFAZOLIN SODIUM 2 G/50ML
2 SOLUTION INTRAVENOUS EVERY 12 HOURS
Status: DISCONTINUED | OUTPATIENT
Start: 2019-01-19 | End: 2019-01-21

## 2019-01-19 RX ORDER — ALLOPURINOL 100 MG/1
100 TABLET ORAL DAILY
Status: DISCONTINUED | OUTPATIENT
Start: 2019-01-19 | End: 2019-01-24 | Stop reason: HOSPADM

## 2019-01-19 RX ADMIN — TRIAMCINOLONE ACETONIDE: 1 OINTMENT TOPICAL at 22:07

## 2019-01-19 RX ADMIN — Medication 10 ML: at 10:42

## 2019-01-19 RX ADMIN — ASPIRIN 81 MG 81 MG: 81 TABLET ORAL at 10:42

## 2019-01-19 RX ADMIN — FUROSEMIDE 40 MG: 10 INJECTION, SOLUTION INTRAMUSCULAR; INTRAVENOUS at 10:42

## 2019-01-19 RX ADMIN — CEFAZOLIN SODIUM 2 G: 2 SOLUTION INTRAVENOUS at 12:55

## 2019-01-19 RX ADMIN — HYDRALAZINE HYDROCHLORIDE 100 MG: 50 TABLET ORAL at 11:15

## 2019-01-19 RX ADMIN — TERBINAFINE HYDROCHLORIDE: 1 CREAM TOPICAL at 22:08

## 2019-01-19 RX ADMIN — ISOSORBIDE MONONITRATE 30 MG: 30 TABLET, EXTENDED RELEASE ORAL at 10:42

## 2019-01-19 RX ADMIN — TERBINAFINE 250 MG: 250 TABLET ORAL at 10:42

## 2019-01-19 RX ADMIN — MICONAZOLE NITRATE: 20 POWDER TOPICAL at 10:43

## 2019-01-19 RX ADMIN — AMLODIPINE BESYLATE 2.5 MG: 2.5 TABLET ORAL at 22:07

## 2019-01-19 RX ADMIN — FUROSEMIDE 40 MG: 10 INJECTION, SOLUTION INTRAMUSCULAR; INTRAVENOUS at 15:24

## 2019-01-19 RX ADMIN — Medication 10 ML: at 22:07

## 2019-01-19 RX ADMIN — FUROSEMIDE 40 MG: 10 INJECTION, SOLUTION INTRAMUSCULAR; INTRAVENOUS at 22:07

## 2019-01-19 RX ADMIN — Medication 10 ML: at 12:56

## 2019-01-19 RX ADMIN — HYDRALAZINE HYDROCHLORIDE 100 MG: 50 TABLET ORAL at 22:07

## 2019-01-19 RX ADMIN — ALLOPURINOL 100 MG: 100 TABLET ORAL at 15:25

## 2019-01-19 RX ADMIN — FLUOCINONIDE: 0.5 CREAM TOPICAL at 22:08

## 2019-01-19 RX ADMIN — TRIAMCINOLONE ACETONIDE: 1 OINTMENT TOPICAL at 10:43

## 2019-01-19 RX ADMIN — MICONAZOLE NITRATE: 20 POWDER TOPICAL at 22:08

## 2019-01-19 RX ADMIN — FLUOCINONIDE: 0.5 CREAM TOPICAL at 10:43

## 2019-01-19 RX ADMIN — HYDRALAZINE HYDROCHLORIDE 100 MG: 50 TABLET ORAL at 16:36

## 2019-01-19 RX ADMIN — CHOLECALCIFEROL TAB 50 MCG (2000 UNIT) 2000 UNITS: 50 TAB at 10:42

## 2019-01-19 RX ADMIN — Medication: at 22:08

## 2019-01-19 RX ADMIN — Medication: at 10:43

## 2019-01-19 RX ADMIN — TERBINAFINE HYDROCHLORIDE: 1 CREAM TOPICAL at 10:43

## 2019-01-19 ASSESSMENT — PAIN SCALES - GENERAL
PAINLEVEL_OUTOF10: 0
PAINLEVEL_OUTOF10: 0

## 2019-01-20 LAB
ALBUMIN SERPL-MCNC: 2.8 G/DL (ref 3.5–5.2)
ALP BLD-CCNC: 80 U/L (ref 40–129)
ALT SERPL-CCNC: 15 U/L (ref 0–40)
ANION GAP SERPL CALCULATED.3IONS-SCNC: 15 MMOL/L (ref 7–16)
AST SERPL-CCNC: 21 U/L (ref 0–39)
BASOPHILS ABSOLUTE: 0.02 E9/L (ref 0–0.2)
BASOPHILS RELATIVE PERCENT: 0.1 % (ref 0–2)
BILIRUB SERPL-MCNC: 0.3 MG/DL (ref 0–1.2)
BUN BLDV-MCNC: 74 MG/DL (ref 8–23)
CALCIUM IONIZED: 1.23 MMOL/L (ref 1.15–1.33)
CALCIUM SERPL-MCNC: 8.2 MG/DL (ref 8.6–10.2)
CHLORIDE BLD-SCNC: 103 MMOL/L (ref 98–107)
CO2: 17 MMOL/L (ref 22–29)
CREAT SERPL-MCNC: 3.2 MG/DL (ref 0.7–1.2)
EOSINOPHILS ABSOLUTE: 0.64 E9/L (ref 0.05–0.5)
EOSINOPHILS RELATIVE PERCENT: 4.5 % (ref 0–6)
GFR AFRICAN AMERICAN: 22
GFR NON-AFRICAN AMERICAN: 18 ML/MIN/1.73
GLUCOSE BLD-MCNC: 91 MG/DL (ref 74–99)
HCT VFR BLD CALC: 24.8 % (ref 37–54)
HEMOGLOBIN: 8.5 G/DL (ref 12.5–16.5)
IMMATURE GRANULOCYTES #: 0.13 E9/L
IMMATURE GRANULOCYTES %: 0.9 % (ref 0–5)
LYMPHOCYTES ABSOLUTE: 0.99 E9/L (ref 1.5–4)
LYMPHOCYTES RELATIVE PERCENT: 6.9 % (ref 20–42)
MCH RBC QN AUTO: 30.2 PG (ref 26–35)
MCHC RBC AUTO-ENTMCNC: 34.3 % (ref 32–34.5)
MCV RBC AUTO: 88.3 FL (ref 80–99.9)
MONOCYTES ABSOLUTE: 0.63 E9/L (ref 0.1–0.95)
MONOCYTES RELATIVE PERCENT: 4.4 % (ref 2–12)
NEUTROPHILS ABSOLUTE: 11.94 E9/L (ref 1.8–7.3)
NEUTROPHILS RELATIVE PERCENT: 83.2 % (ref 43–80)
PDW BLD-RTO: 14.9 FL (ref 11.5–15)
PLATELET # BLD: 209 E9/L (ref 130–450)
PMV BLD AUTO: 10.3 FL (ref 7–12)
POTASSIUM SERPL-SCNC: 4.4 MMOL/L (ref 3.5–5)
RBC # BLD: 2.81 E12/L (ref 3.8–5.8)
SODIUM BLD-SCNC: 135 MMOL/L (ref 132–146)
TOTAL PROTEIN: 5.3 G/DL (ref 6.4–8.3)
URINE CULTURE, ROUTINE: NORMAL
WBC # BLD: 14.4 E9/L (ref 4.5–11.5)

## 2019-01-20 PROCEDURE — 6360000002 HC RX W HCPCS: Performed by: INTERNAL MEDICINE

## 2019-01-20 PROCEDURE — 85025 COMPLETE CBC W/AUTO DIFF WBC: CPT

## 2019-01-20 PROCEDURE — 2580000003 HC RX 258: Performed by: INTERNAL MEDICINE

## 2019-01-20 PROCEDURE — 36415 COLL VENOUS BLD VENIPUNCTURE: CPT

## 2019-01-20 PROCEDURE — 99232 SBSQ HOSP IP/OBS MODERATE 35: CPT | Performed by: INTERNAL MEDICINE

## 2019-01-20 PROCEDURE — 82330 ASSAY OF CALCIUM: CPT

## 2019-01-20 PROCEDURE — 6370000000 HC RX 637 (ALT 250 FOR IP): Performed by: INTERNAL MEDICINE

## 2019-01-20 PROCEDURE — 80053 COMPREHEN METABOLIC PANEL: CPT

## 2019-01-20 PROCEDURE — 1200000000 HC SEMI PRIVATE

## 2019-01-20 RX ADMIN — FUROSEMIDE 40 MG: 10 INJECTION, SOLUTION INTRAMUSCULAR; INTRAVENOUS at 15:07

## 2019-01-20 RX ADMIN — ALLOPURINOL 100 MG: 100 TABLET ORAL at 10:14

## 2019-01-20 RX ADMIN — CEFAZOLIN SODIUM 2 G: 2 SOLUTION INTRAVENOUS at 12:46

## 2019-01-20 RX ADMIN — HYDRALAZINE HYDROCHLORIDE 100 MG: 50 TABLET ORAL at 10:43

## 2019-01-20 RX ADMIN — Medication: at 21:51

## 2019-01-20 RX ADMIN — FUROSEMIDE 40 MG: 10 INJECTION, SOLUTION INTRAMUSCULAR; INTRAVENOUS at 21:50

## 2019-01-20 RX ADMIN — FLUOCINONIDE: 0.5 CREAM TOPICAL at 10:15

## 2019-01-20 RX ADMIN — FLUOCINONIDE: 0.5 CREAM TOPICAL at 21:50

## 2019-01-20 RX ADMIN — Medication 10 ML: at 10:14

## 2019-01-20 RX ADMIN — HYDRALAZINE HYDROCHLORIDE 100 MG: 50 TABLET ORAL at 15:07

## 2019-01-20 RX ADMIN — FUROSEMIDE 40 MG: 10 INJECTION, SOLUTION INTRAMUSCULAR; INTRAVENOUS at 10:14

## 2019-01-20 RX ADMIN — TERBINAFINE HYDROCHLORIDE: 1 CREAM TOPICAL at 21:50

## 2019-01-20 RX ADMIN — HYDRALAZINE HYDROCHLORIDE 100 MG: 50 TABLET ORAL at 21:50

## 2019-01-20 RX ADMIN — TRIAMCINOLONE ACETONIDE: 1 OINTMENT TOPICAL at 21:50

## 2019-01-20 RX ADMIN — MICONAZOLE NITRATE: 20 POWDER TOPICAL at 10:15

## 2019-01-20 RX ADMIN — ASPIRIN 81 MG 81 MG: 81 TABLET ORAL at 10:14

## 2019-01-20 RX ADMIN — Medication 10 ML: at 21:51

## 2019-01-20 RX ADMIN — CHOLECALCIFEROL TAB 50 MCG (2000 UNIT) 2000 UNITS: 50 TAB at 06:18

## 2019-01-20 RX ADMIN — AMLODIPINE BESYLATE 2.5 MG: 2.5 TABLET ORAL at 21:50

## 2019-01-20 RX ADMIN — ISOSORBIDE MONONITRATE 30 MG: 30 TABLET, EXTENDED RELEASE ORAL at 10:14

## 2019-01-20 RX ADMIN — TRIAMCINOLONE ACETONIDE: 1 OINTMENT TOPICAL at 10:14

## 2019-01-20 RX ADMIN — TERBINAFINE HYDROCHLORIDE: 1 CREAM TOPICAL at 10:15

## 2019-01-20 RX ADMIN — CEFAZOLIN SODIUM 2 G: 2 SOLUTION INTRAVENOUS at 01:10

## 2019-01-20 RX ADMIN — Medication: at 10:15

## 2019-01-20 RX ADMIN — ACETAMINOPHEN 650 MG: 325 TABLET ORAL at 15:14

## 2019-01-20 RX ADMIN — TERBINAFINE 250 MG: 250 TABLET ORAL at 10:14

## 2019-01-20 RX ADMIN — MICONAZOLE NITRATE: 20 POWDER TOPICAL at 21:51

## 2019-01-20 ASSESSMENT — PAIN SCALES - GENERAL
PAINLEVEL_OUTOF10: 0

## 2019-01-20 ASSESSMENT — PAIN DESCRIPTION - ONSET: ONSET: GRADUAL

## 2019-01-20 ASSESSMENT — PAIN DESCRIPTION - ORIENTATION: ORIENTATION: RIGHT

## 2019-01-20 ASSESSMENT — PAIN DESCRIPTION - FREQUENCY: FREQUENCY: INTERMITTENT

## 2019-01-20 ASSESSMENT — PAIN DESCRIPTION - LOCATION: LOCATION: HAND

## 2019-01-20 ASSESSMENT — PAIN DESCRIPTION - DESCRIPTORS: DESCRIPTORS: ACHING

## 2019-01-20 ASSESSMENT — PAIN DESCRIPTION - PAIN TYPE: TYPE: ACUTE PAIN

## 2019-01-21 LAB
ALBUMIN SERPL-MCNC: 2.7 G/DL (ref 3.5–5.2)
ALP BLD-CCNC: 71 U/L (ref 40–129)
ALT SERPL-CCNC: 6 U/L (ref 0–40)
ANION GAP SERPL CALCULATED.3IONS-SCNC: 14 MMOL/L (ref 7–16)
AST SERPL-CCNC: 17 U/L (ref 0–39)
BASOPHILS ABSOLUTE: 0.02 E9/L (ref 0–0.2)
BASOPHILS RELATIVE PERCENT: 0.2 % (ref 0–2)
BILIRUB SERPL-MCNC: <0.2 MG/DL (ref 0–1.2)
BUN BLDV-MCNC: 77 MG/DL (ref 8–23)
CALCIUM IONIZED: 1.22 MMOL/L (ref 1.15–1.33)
CALCIUM SERPL-MCNC: 8.1 MG/DL (ref 8.6–10.2)
CHLORIDE BLD-SCNC: 103 MMOL/L (ref 98–107)
CO2: 19 MMOL/L (ref 22–29)
CREAT SERPL-MCNC: 3.3 MG/DL (ref 0.7–1.2)
EKG ATRIAL RATE: 57 BPM
EKG Q-T INTERVAL: 488 MS
EKG QRS DURATION: 154 MS
EKG QTC CALCULATION (BAZETT): 474 MS
EKG R AXIS: -12 DEGREES
EKG T AXIS: 11 DEGREES
EKG VENTRICULAR RATE: 57 BPM
EOSINOPHILS ABSOLUTE: 1.06 E9/L (ref 0.05–0.5)
EOSINOPHILS RELATIVE PERCENT: 10.9 % (ref 0–6)
GFR AFRICAN AMERICAN: 21
GFR NON-AFRICAN AMERICAN: 18 ML/MIN/1.73
GLUCOSE BLD-MCNC: 111 MG/DL (ref 74–99)
HCT VFR BLD CALC: 24.4 % (ref 37–54)
HEMOGLOBIN: 8.2 G/DL (ref 12.5–16.5)
IMMATURE GRANULOCYTES #: 0.07 E9/L
IMMATURE GRANULOCYTES %: 0.7 % (ref 0–5)
LYMPHOCYTES ABSOLUTE: 0.93 E9/L (ref 1.5–4)
LYMPHOCYTES RELATIVE PERCENT: 9.5 % (ref 20–42)
MCH RBC QN AUTO: 29.3 PG (ref 26–35)
MCHC RBC AUTO-ENTMCNC: 33.6 % (ref 32–34.5)
MCV RBC AUTO: 87.1 FL (ref 80–99.9)
MONOCYTES ABSOLUTE: 0.58 E9/L (ref 0.1–0.95)
MONOCYTES RELATIVE PERCENT: 6 % (ref 2–12)
NEUTROPHILS ABSOLUTE: 7.08 E9/L (ref 1.8–7.3)
NEUTROPHILS RELATIVE PERCENT: 72.7 % (ref 43–80)
PDW BLD-RTO: 14.7 FL (ref 11.5–15)
PLATELET # BLD: 202 E9/L (ref 130–450)
PMV BLD AUTO: 9.8 FL (ref 7–12)
POTASSIUM SERPL-SCNC: 4 MMOL/L (ref 3.5–5)
RBC # BLD: 2.8 E12/L (ref 3.8–5.8)
SODIUM BLD-SCNC: 136 MMOL/L (ref 132–146)
TOTAL PROTEIN: 5.2 G/DL (ref 6.4–8.3)
WBC # BLD: 9.7 E9/L (ref 4.5–11.5)

## 2019-01-21 PROCEDURE — 97530 THERAPEUTIC ACTIVITIES: CPT

## 2019-01-21 PROCEDURE — 82330 ASSAY OF CALCIUM: CPT

## 2019-01-21 PROCEDURE — 36415 COLL VENOUS BLD VENIPUNCTURE: CPT

## 2019-01-21 PROCEDURE — 99232 SBSQ HOSP IP/OBS MODERATE 35: CPT | Performed by: INTERNAL MEDICINE

## 2019-01-21 PROCEDURE — 6370000000 HC RX 637 (ALT 250 FOR IP): Performed by: INTERNAL MEDICINE

## 2019-01-21 PROCEDURE — 2580000003 HC RX 258: Performed by: INTERNAL MEDICINE

## 2019-01-21 PROCEDURE — 1200000000 HC SEMI PRIVATE

## 2019-01-21 PROCEDURE — 6360000002 HC RX W HCPCS: Performed by: INTERNAL MEDICINE

## 2019-01-21 PROCEDURE — 80053 COMPREHEN METABOLIC PANEL: CPT

## 2019-01-21 PROCEDURE — 93005 ELECTROCARDIOGRAM TRACING: CPT | Performed by: INTERNAL MEDICINE

## 2019-01-21 PROCEDURE — 85025 COMPLETE CBC W/AUTO DIFF WBC: CPT

## 2019-01-21 RX ORDER — CEFDINIR 300 MG/1
300 CAPSULE ORAL DAILY
Status: DISCONTINUED | OUTPATIENT
Start: 2019-01-21 | End: 2019-01-24 | Stop reason: HOSPADM

## 2019-01-21 RX ORDER — BUMETANIDE 1 MG/1
1 TABLET ORAL 2 TIMES DAILY
Status: DISCONTINUED | OUTPATIENT
Start: 2019-01-22 | End: 2019-01-24 | Stop reason: HOSPADM

## 2019-01-21 RX ADMIN — FLUOCINONIDE: 0.5 CREAM TOPICAL at 08:52

## 2019-01-21 RX ADMIN — CHOLECALCIFEROL TAB 50 MCG (2000 UNIT) 2000 UNITS: 50 TAB at 06:13

## 2019-01-21 RX ADMIN — MICONAZOLE NITRATE: 20 POWDER TOPICAL at 08:53

## 2019-01-21 RX ADMIN — ISOSORBIDE MONONITRATE 30 MG: 30 TABLET, EXTENDED RELEASE ORAL at 08:52

## 2019-01-21 RX ADMIN — Medication 10 ML: at 08:53

## 2019-01-21 RX ADMIN — CEFAZOLIN SODIUM 2 G: 2 SOLUTION INTRAVENOUS at 13:16

## 2019-01-21 RX ADMIN — HYDRALAZINE HYDROCHLORIDE 100 MG: 50 TABLET ORAL at 20:06

## 2019-01-21 RX ADMIN — ASPIRIN 81 MG 81 MG: 81 TABLET ORAL at 08:52

## 2019-01-21 RX ADMIN — HYDRALAZINE HYDROCHLORIDE 100 MG: 50 TABLET ORAL at 08:52

## 2019-01-21 RX ADMIN — TRIAMCINOLONE ACETONIDE: 1 OINTMENT TOPICAL at 20:12

## 2019-01-21 RX ADMIN — ALLOPURINOL 100 MG: 100 TABLET ORAL at 08:52

## 2019-01-21 RX ADMIN — CEFDINIR 300 MG: 300 CAPSULE ORAL at 16:33

## 2019-01-21 RX ADMIN — FLUOCINONIDE: 0.5 CREAM TOPICAL at 20:08

## 2019-01-21 RX ADMIN — Medication 10 ML: at 20:07

## 2019-01-21 RX ADMIN — TRIAMCINOLONE ACETONIDE: 1 OINTMENT TOPICAL at 08:53

## 2019-01-21 RX ADMIN — MICONAZOLE NITRATE: 20 POWDER TOPICAL at 20:09

## 2019-01-21 RX ADMIN — FUROSEMIDE 40 MG: 10 INJECTION, SOLUTION INTRAMUSCULAR; INTRAVENOUS at 13:16

## 2019-01-21 RX ADMIN — HYDRALAZINE HYDROCHLORIDE 100 MG: 50 TABLET ORAL at 13:16

## 2019-01-21 RX ADMIN — FUROSEMIDE 40 MG: 10 INJECTION, SOLUTION INTRAMUSCULAR; INTRAVENOUS at 08:52

## 2019-01-21 RX ADMIN — AMLODIPINE BESYLATE 2.5 MG: 2.5 TABLET ORAL at 20:06

## 2019-01-21 RX ADMIN — TERBINAFINE HYDROCHLORIDE: 1 CREAM TOPICAL at 20:11

## 2019-01-21 RX ADMIN — TERBINAFINE HYDROCHLORIDE: 1 CREAM TOPICAL at 08:53

## 2019-01-21 RX ADMIN — TERBINAFINE 250 MG: 250 TABLET ORAL at 08:52

## 2019-01-21 RX ADMIN — CEFAZOLIN SODIUM 2 G: 2 SOLUTION INTRAVENOUS at 01:07

## 2019-01-21 ASSESSMENT — PAIN SCALES - GENERAL
PAINLEVEL_OUTOF10: 0
PAINLEVEL_OUTOF10: 0

## 2019-01-22 LAB
ALBUMIN SERPL-MCNC: 3 G/DL (ref 3.5–5.2)
ALP BLD-CCNC: 81 U/L (ref 40–129)
ALT SERPL-CCNC: <5 U/L (ref 0–40)
ANION GAP SERPL CALCULATED.3IONS-SCNC: 14 MMOL/L (ref 7–16)
AST SERPL-CCNC: 19 U/L (ref 0–39)
BASOPHILS ABSOLUTE: 0.03 E9/L (ref 0–0.2)
BASOPHILS RELATIVE PERCENT: 0.3 % (ref 0–2)
BILIRUB SERPL-MCNC: <0.2 MG/DL (ref 0–1.2)
BUN BLDV-MCNC: 77 MG/DL (ref 8–23)
CALCIUM SERPL-MCNC: 8.2 MG/DL (ref 8.6–10.2)
CHLORIDE BLD-SCNC: 103 MMOL/L (ref 98–107)
CO2: 20 MMOL/L (ref 22–29)
CREAT SERPL-MCNC: 3.5 MG/DL (ref 0.7–1.2)
EOSINOPHILS ABSOLUTE: 1.43 E9/L (ref 0.05–0.5)
EOSINOPHILS RELATIVE PERCENT: 14.5 % (ref 0–6)
GFR AFRICAN AMERICAN: 20
GFR NON-AFRICAN AMERICAN: 16 ML/MIN/1.73
GLUCOSE BLD-MCNC: 110 MG/DL (ref 74–99)
HCT VFR BLD CALC: 24.8 % (ref 37–54)
HEMOGLOBIN: 8.5 G/DL (ref 12.5–16.5)
IMMATURE GRANULOCYTES #: 0.08 E9/L
IMMATURE GRANULOCYTES %: 0.8 % (ref 0–5)
LYMPHOCYTES ABSOLUTE: 0.95 E9/L (ref 1.5–4)
LYMPHOCYTES RELATIVE PERCENT: 9.6 % (ref 20–42)
MCH RBC QN AUTO: 30.1 PG (ref 26–35)
MCHC RBC AUTO-ENTMCNC: 34.3 % (ref 32–34.5)
MCV RBC AUTO: 87.9 FL (ref 80–99.9)
MONOCYTES ABSOLUTE: 0.64 E9/L (ref 0.1–0.95)
MONOCYTES RELATIVE PERCENT: 6.5 % (ref 2–12)
NEUTROPHILS ABSOLUTE: 6.75 E9/L (ref 1.8–7.3)
NEUTROPHILS RELATIVE PERCENT: 68.3 % (ref 43–80)
PDW BLD-RTO: 14.7 FL (ref 11.5–15)
PLATELET # BLD: 226 E9/L (ref 130–450)
PMV BLD AUTO: 9.8 FL (ref 7–12)
POTASSIUM SERPL-SCNC: 4.2 MMOL/L (ref 3.5–5)
RBC # BLD: 2.82 E12/L (ref 3.8–5.8)
SODIUM BLD-SCNC: 137 MMOL/L (ref 132–146)
TOTAL PROTEIN: 5.5 G/DL (ref 6.4–8.3)
WBC # BLD: 9.9 E9/L (ref 4.5–11.5)

## 2019-01-22 PROCEDURE — 97165 OT EVAL LOW COMPLEX 30 MIN: CPT

## 2019-01-22 PROCEDURE — 6370000000 HC RX 637 (ALT 250 FOR IP): Performed by: INTERNAL MEDICINE

## 2019-01-22 PROCEDURE — 80053 COMPREHEN METABOLIC PANEL: CPT

## 2019-01-22 PROCEDURE — 36415 COLL VENOUS BLD VENIPUNCTURE: CPT

## 2019-01-22 PROCEDURE — 2580000003 HC RX 258: Performed by: INTERNAL MEDICINE

## 2019-01-22 PROCEDURE — 85025 COMPLETE CBC W/AUTO DIFF WBC: CPT

## 2019-01-22 PROCEDURE — 1200000000 HC SEMI PRIVATE

## 2019-01-22 RX ORDER — CEFDINIR 300 MG/1
300 CAPSULE ORAL DAILY
Qty: 6 CAPSULE | Refills: 0 | Status: SHIPPED | OUTPATIENT
Start: 2019-01-23 | End: 2019-01-29

## 2019-01-22 RX ADMIN — HYDRALAZINE HYDROCHLORIDE 100 MG: 50 TABLET ORAL at 19:50

## 2019-01-22 RX ADMIN — ISOSORBIDE MONONITRATE 30 MG: 30 TABLET, EXTENDED RELEASE ORAL at 08:18

## 2019-01-22 RX ADMIN — FLUOCINONIDE: 0.5 CREAM TOPICAL at 19:51

## 2019-01-22 RX ADMIN — TERBINAFINE HYDROCHLORIDE: 1 CREAM TOPICAL at 08:19

## 2019-01-22 RX ADMIN — HYDRALAZINE HYDROCHLORIDE 100 MG: 50 TABLET ORAL at 08:18

## 2019-01-22 RX ADMIN — CHOLECALCIFEROL TAB 50 MCG (2000 UNIT) 2000 UNITS: 50 TAB at 05:36

## 2019-01-22 RX ADMIN — AMLODIPINE BESYLATE 2.5 MG: 2.5 TABLET ORAL at 19:51

## 2019-01-22 RX ADMIN — Medication 10 ML: at 19:52

## 2019-01-22 RX ADMIN — TRIAMCINOLONE ACETONIDE: 1 OINTMENT TOPICAL at 08:19

## 2019-01-22 RX ADMIN — ALLOPURINOL 100 MG: 100 TABLET ORAL at 08:18

## 2019-01-22 RX ADMIN — Medication: at 19:52

## 2019-01-22 RX ADMIN — CEFDINIR 300 MG: 300 CAPSULE ORAL at 08:19

## 2019-01-22 RX ADMIN — ASPIRIN 81 MG 81 MG: 81 TABLET ORAL at 08:18

## 2019-01-22 RX ADMIN — BUMETANIDE 1 MG: 1 TABLET ORAL at 16:14

## 2019-01-22 RX ADMIN — HYDRALAZINE HYDROCHLORIDE 100 MG: 50 TABLET ORAL at 13:54

## 2019-01-22 RX ADMIN — TERBINAFINE 250 MG: 250 TABLET ORAL at 08:19

## 2019-01-22 RX ADMIN — FLUOCINONIDE: 0.5 CREAM TOPICAL at 08:19

## 2019-01-22 RX ADMIN — TRIAMCINOLONE ACETONIDE: 1 OINTMENT TOPICAL at 19:51

## 2019-01-22 RX ADMIN — MICONAZOLE NITRATE: 20 POWDER TOPICAL at 19:51

## 2019-01-22 RX ADMIN — TERBINAFINE HYDROCHLORIDE: 1 CREAM TOPICAL at 19:51

## 2019-01-22 RX ADMIN — Medication 10 ML: at 08:19

## 2019-01-22 RX ADMIN — MICONAZOLE NITRATE: 20 POWDER TOPICAL at 08:19

## 2019-01-22 RX ADMIN — BUMETANIDE 1 MG: 1 TABLET ORAL at 08:18

## 2019-01-22 ASSESSMENT — PAIN SCALES - GENERAL
PAINLEVEL_OUTOF10: 0
PAINLEVEL_OUTOF10: 0

## 2019-01-23 LAB
ALBUMIN SERPL-MCNC: 2.8 G/DL (ref 3.5–5.2)
ALP BLD-CCNC: 67 U/L (ref 40–129)
ALT SERPL-CCNC: <5 U/L (ref 0–40)
ANION GAP SERPL CALCULATED.3IONS-SCNC: 12 MMOL/L (ref 7–16)
AST SERPL-CCNC: 16 U/L (ref 0–39)
BASOPHILS ABSOLUTE: 0.02 E9/L (ref 0–0.2)
BASOPHILS RELATIVE PERCENT: 0.2 % (ref 0–2)
BILIRUB SERPL-MCNC: <0.2 MG/DL (ref 0–1.2)
BUN BLDV-MCNC: 75 MG/DL (ref 8–23)
CALCIUM SERPL-MCNC: 7.9 MG/DL (ref 8.6–10.2)
CHLORIDE BLD-SCNC: 102 MMOL/L (ref 98–107)
CO2: 21 MMOL/L (ref 22–29)
CREAT SERPL-MCNC: 3.2 MG/DL (ref 0.7–1.2)
EOSINOPHILS ABSOLUTE: 1.51 E9/L (ref 0.05–0.5)
EOSINOPHILS RELATIVE PERCENT: 18.4 % (ref 0–6)
GFR AFRICAN AMERICAN: 22
GFR NON-AFRICAN AMERICAN: 18 ML/MIN/1.73
GLUCOSE BLD-MCNC: 94 MG/DL (ref 74–99)
HCT VFR BLD CALC: 24.2 % (ref 37–54)
HEMOGLOBIN: 7.9 G/DL (ref 12.5–16.5)
IMMATURE GRANULOCYTES #: 0.08 E9/L
IMMATURE GRANULOCYTES %: 1 % (ref 0–5)
LYMPHOCYTES ABSOLUTE: 0.94 E9/L (ref 1.5–4)
LYMPHOCYTES RELATIVE PERCENT: 11.5 % (ref 20–42)
MAGNESIUM: 2.2 MG/DL (ref 1.6–2.6)
MCH RBC QN AUTO: 29.2 PG (ref 26–35)
MCHC RBC AUTO-ENTMCNC: 32.6 % (ref 32–34.5)
MCV RBC AUTO: 89.3 FL (ref 80–99.9)
MONOCYTES ABSOLUTE: 0.54 E9/L (ref 0.1–0.95)
MONOCYTES RELATIVE PERCENT: 6.6 % (ref 2–12)
NEUTROPHILS ABSOLUTE: 5.1 E9/L (ref 1.8–7.3)
NEUTROPHILS RELATIVE PERCENT: 62.3 % (ref 43–80)
PDW BLD-RTO: 14.8 FL (ref 11.5–15)
PHOSPHORUS: 3.9 MG/DL (ref 2.5–4.5)
PLATELET # BLD: 207 E9/L (ref 130–450)
PMV BLD AUTO: 9.8 FL (ref 7–12)
POTASSIUM SERPL-SCNC: 4 MMOL/L (ref 3.5–5)
RBC # BLD: 2.71 E12/L (ref 3.8–5.8)
SODIUM BLD-SCNC: 135 MMOL/L (ref 132–146)
TOTAL PROTEIN: 5 G/DL (ref 6.4–8.3)
WBC # BLD: 8.2 E9/L (ref 4.5–11.5)

## 2019-01-23 PROCEDURE — 1200000000 HC SEMI PRIVATE

## 2019-01-23 PROCEDURE — 2580000003 HC RX 258: Performed by: INTERNAL MEDICINE

## 2019-01-23 PROCEDURE — 97530 THERAPEUTIC ACTIVITIES: CPT

## 2019-01-23 PROCEDURE — 36415 COLL VENOUS BLD VENIPUNCTURE: CPT

## 2019-01-23 PROCEDURE — 84100 ASSAY OF PHOSPHORUS: CPT

## 2019-01-23 PROCEDURE — 80053 COMPREHEN METABOLIC PANEL: CPT

## 2019-01-23 PROCEDURE — 83735 ASSAY OF MAGNESIUM: CPT

## 2019-01-23 PROCEDURE — 6370000000 HC RX 637 (ALT 250 FOR IP): Performed by: INTERNAL MEDICINE

## 2019-01-23 PROCEDURE — 93010 ELECTROCARDIOGRAM REPORT: CPT | Performed by: INTERNAL MEDICINE

## 2019-01-23 PROCEDURE — 97110 THERAPEUTIC EXERCISES: CPT

## 2019-01-23 PROCEDURE — 85025 COMPLETE CBC W/AUTO DIFF WBC: CPT

## 2019-01-23 RX ORDER — TERBINAFINE HYDROCHLORIDE 250 MG/1
250 TABLET ORAL DAILY
Qty: 6 TABLET | Refills: 0 | DISCHARGE
Start: 2019-01-23 | End: 2019-01-29

## 2019-01-23 RX ORDER — AMMONIUM LACTATE 12 G/100G
LOTION TOPICAL
DISCHARGE
Start: 2019-01-23

## 2019-01-23 RX ORDER — BUMETANIDE 1 MG/1
1 TABLET ORAL 2 TIMES DAILY
Qty: 30 TABLET | Refills: 3 | Status: ON HOLD | DISCHARGE
Start: 2019-01-23 | End: 2019-02-15 | Stop reason: HOSPADM

## 2019-01-23 RX ORDER — ALLOPURINOL 100 MG/1
100 TABLET ORAL DAILY
Qty: 30 TABLET | Refills: 3 | DISCHARGE
Start: 2019-01-23

## 2019-01-23 RX ADMIN — BUMETANIDE 1 MG: 1 TABLET ORAL at 09:09

## 2019-01-23 RX ADMIN — Medication 10 ML: at 09:09

## 2019-01-23 RX ADMIN — TERBINAFINE HYDROCHLORIDE: 1 CREAM TOPICAL at 20:12

## 2019-01-23 RX ADMIN — BUMETANIDE 1 MG: 1 TABLET ORAL at 17:04

## 2019-01-23 RX ADMIN — TERBINAFINE HYDROCHLORIDE: 1 CREAM TOPICAL at 09:09

## 2019-01-23 RX ADMIN — ALLOPURINOL 100 MG: 100 TABLET ORAL at 09:09

## 2019-01-23 RX ADMIN — TERBINAFINE 250 MG: 250 TABLET ORAL at 09:09

## 2019-01-23 RX ADMIN — HYDRALAZINE HYDROCHLORIDE 100 MG: 50 TABLET ORAL at 20:11

## 2019-01-23 RX ADMIN — MICONAZOLE NITRATE: 20 POWDER TOPICAL at 20:12

## 2019-01-23 RX ADMIN — ISOSORBIDE MONONITRATE 30 MG: 30 TABLET, EXTENDED RELEASE ORAL at 09:09

## 2019-01-23 RX ADMIN — MICONAZOLE NITRATE: 20 POWDER TOPICAL at 09:09

## 2019-01-23 RX ADMIN — FLUOCINONIDE: 0.5 CREAM TOPICAL at 20:11

## 2019-01-23 RX ADMIN — CEFDINIR 300 MG: 300 CAPSULE ORAL at 09:09

## 2019-01-23 RX ADMIN — CHOLECALCIFEROL TAB 50 MCG (2000 UNIT) 2000 UNITS: 50 TAB at 06:10

## 2019-01-23 RX ADMIN — AMLODIPINE BESYLATE 2.5 MG: 2.5 TABLET ORAL at 20:11

## 2019-01-23 RX ADMIN — ASPIRIN 81 MG 81 MG: 81 TABLET ORAL at 09:09

## 2019-01-23 RX ADMIN — HYDRALAZINE HYDROCHLORIDE 100 MG: 50 TABLET ORAL at 09:09

## 2019-01-23 RX ADMIN — FLUOCINONIDE: 0.5 CREAM TOPICAL at 09:09

## 2019-01-23 RX ADMIN — Medication 10 ML: at 20:12

## 2019-01-23 RX ADMIN — HYDRALAZINE HYDROCHLORIDE 100 MG: 50 TABLET ORAL at 14:06

## 2019-01-23 RX ADMIN — TRIAMCINOLONE ACETONIDE: 1 OINTMENT TOPICAL at 20:12

## 2019-01-23 RX ADMIN — TRIAMCINOLONE ACETONIDE: 1 OINTMENT TOPICAL at 09:10

## 2019-01-23 ASSESSMENT — PAIN SCALES - GENERAL
PAINLEVEL_OUTOF10: 0

## 2019-01-24 VITALS
HEART RATE: 58 BPM | OXYGEN SATURATION: 95 % | TEMPERATURE: 97.9 F | DIASTOLIC BLOOD PRESSURE: 64 MMHG | WEIGHT: 189 LBS | HEIGHT: 69 IN | RESPIRATION RATE: 18 BRPM | SYSTOLIC BLOOD PRESSURE: 156 MMHG | BODY MASS INDEX: 27.99 KG/M2

## 2019-01-24 LAB
ALBUMIN SERPL-MCNC: 2.9 G/DL (ref 3.5–5.2)
ALP BLD-CCNC: 70 U/L (ref 40–129)
ALT SERPL-CCNC: <5 U/L (ref 0–40)
ANION GAP SERPL CALCULATED.3IONS-SCNC: 14 MMOL/L (ref 7–16)
AST SERPL-CCNC: 17 U/L (ref 0–39)
BASOPHILS ABSOLUTE: 0.03 E9/L (ref 0–0.2)
BASOPHILS RELATIVE PERCENT: 0.3 % (ref 0–2)
BILIRUB SERPL-MCNC: 0.2 MG/DL (ref 0–1.2)
BUN BLDV-MCNC: 77 MG/DL (ref 8–23)
CALCIUM SERPL-MCNC: 8.1 MG/DL (ref 8.6–10.2)
CHLORIDE BLD-SCNC: 105 MMOL/L (ref 98–107)
CO2: 20 MMOL/L (ref 22–29)
CREAT SERPL-MCNC: 2.9 MG/DL (ref 0.7–1.2)
EOSINOPHILS ABSOLUTE: 1.42 E9/L (ref 0.05–0.5)
EOSINOPHILS RELATIVE PERCENT: 16.1 % (ref 0–6)
GFR AFRICAN AMERICAN: 25
GFR NON-AFRICAN AMERICAN: 20 ML/MIN/1.73
GLUCOSE BLD-MCNC: 119 MG/DL (ref 74–99)
HCT VFR BLD CALC: 25.6 % (ref 37–54)
HEMOGLOBIN: 8.5 G/DL (ref 12.5–16.5)
IMMATURE GRANULOCYTES #: 0.07 E9/L
IMMATURE GRANULOCYTES %: 0.8 % (ref 0–5)
LYMPHOCYTES ABSOLUTE: 0.99 E9/L (ref 1.5–4)
LYMPHOCYTES RELATIVE PERCENT: 11.2 % (ref 20–42)
MAGNESIUM: 2.3 MG/DL (ref 1.6–2.6)
MCH RBC QN AUTO: 29.5 PG (ref 26–35)
MCHC RBC AUTO-ENTMCNC: 33.2 % (ref 32–34.5)
MCV RBC AUTO: 88.9 FL (ref 80–99.9)
MONOCYTES ABSOLUTE: 0.57 E9/L (ref 0.1–0.95)
MONOCYTES RELATIVE PERCENT: 6.4 % (ref 2–12)
NEUTROPHILS ABSOLUTE: 5.76 E9/L (ref 1.8–7.3)
NEUTROPHILS RELATIVE PERCENT: 65.2 % (ref 43–80)
PDW BLD-RTO: 14.6 FL (ref 11.5–15)
PHOSPHORUS: 3.8 MG/DL (ref 2.5–4.5)
PLATELET # BLD: 214 E9/L (ref 130–450)
PMV BLD AUTO: 9.8 FL (ref 7–12)
POTASSIUM SERPL-SCNC: 4.4 MMOL/L (ref 3.5–5)
RBC # BLD: 2.88 E12/L (ref 3.8–5.8)
SODIUM BLD-SCNC: 139 MMOL/L (ref 132–146)
TOTAL PROTEIN: 5.4 G/DL (ref 6.4–8.3)
WBC # BLD: 8.8 E9/L (ref 4.5–11.5)

## 2019-01-24 PROCEDURE — 2580000003 HC RX 258: Performed by: INTERNAL MEDICINE

## 2019-01-24 PROCEDURE — 83735 ASSAY OF MAGNESIUM: CPT

## 2019-01-24 PROCEDURE — 36415 COLL VENOUS BLD VENIPUNCTURE: CPT

## 2019-01-24 PROCEDURE — 85025 COMPLETE CBC W/AUTO DIFF WBC: CPT

## 2019-01-24 PROCEDURE — 6370000000 HC RX 637 (ALT 250 FOR IP): Performed by: INTERNAL MEDICINE

## 2019-01-24 PROCEDURE — 84100 ASSAY OF PHOSPHORUS: CPT

## 2019-01-24 PROCEDURE — 80053 COMPREHEN METABOLIC PANEL: CPT

## 2019-01-24 PROCEDURE — 97530 THERAPEUTIC ACTIVITIES: CPT

## 2019-01-24 RX ADMIN — Medication 10 ML: at 09:21

## 2019-01-24 RX ADMIN — ALLOPURINOL 100 MG: 100 TABLET ORAL at 09:20

## 2019-01-24 RX ADMIN — TRIAMCINOLONE ACETONIDE: 1 OINTMENT TOPICAL at 09:28

## 2019-01-24 RX ADMIN — CEFDINIR 300 MG: 300 CAPSULE ORAL at 09:20

## 2019-01-24 RX ADMIN — MICONAZOLE NITRATE: 20 POWDER TOPICAL at 09:21

## 2019-01-24 RX ADMIN — FLUOCINONIDE: 0.5 CREAM TOPICAL at 09:21

## 2019-01-24 RX ADMIN — ISOSORBIDE MONONITRATE 30 MG: 30 TABLET, EXTENDED RELEASE ORAL at 09:20

## 2019-01-24 RX ADMIN — CHOLECALCIFEROL TAB 50 MCG (2000 UNIT) 2000 UNITS: 50 TAB at 06:59

## 2019-01-24 RX ADMIN — HYDRALAZINE HYDROCHLORIDE 100 MG: 50 TABLET ORAL at 09:20

## 2019-01-24 RX ADMIN — BUMETANIDE 1 MG: 1 TABLET ORAL at 09:20

## 2019-01-24 RX ADMIN — Medication: at 09:27

## 2019-01-24 RX ADMIN — HYDRALAZINE HYDROCHLORIDE 100 MG: 50 TABLET ORAL at 16:23

## 2019-01-24 RX ADMIN — ASPIRIN 81 MG 81 MG: 81 TABLET ORAL at 09:20

## 2019-01-24 RX ADMIN — TERBINAFINE 250 MG: 250 TABLET ORAL at 09:20

## 2019-01-24 RX ADMIN — BUMETANIDE 1 MG: 1 TABLET ORAL at 16:23

## 2019-01-24 RX ADMIN — TERBINAFINE HYDROCHLORIDE: 1 CREAM TOPICAL at 09:28

## 2019-01-24 ASSESSMENT — PAIN SCALES - GENERAL: PAINLEVEL_OUTOF10: 0

## 2019-02-12 ENCOUNTER — HOSPITAL ENCOUNTER (INPATIENT)
Age: 84
LOS: 3 days | Discharge: HOME | DRG: 291 | End: 2019-02-15
Attending: EMERGENCY MEDICINE | Admitting: INTERNAL MEDICINE
Payer: MEDICARE

## 2019-02-12 ENCOUNTER — APPOINTMENT (OUTPATIENT)
Dept: GENERAL RADIOLOGY | Age: 84
DRG: 291 | End: 2019-02-12
Payer: MEDICARE

## 2019-02-12 DIAGNOSIS — I50.1 PULMONARY EDEMA CARDIAC CAUSE (HCC): Primary | ICD-10-CM

## 2019-02-12 LAB
ALBUMIN SERPL-MCNC: 3.3 G/DL (ref 3.5–5.2)
ALP BLD-CCNC: 84 U/L (ref 40–129)
ALT SERPL-CCNC: 16 U/L (ref 0–40)
ANION GAP SERPL CALCULATED.3IONS-SCNC: 13 MMOL/L (ref 7–16)
AST SERPL-CCNC: 35 U/L (ref 0–39)
BACTERIA: ABNORMAL /HPF
BASOPHILS ABSOLUTE: 0.02 E9/L (ref 0–0.2)
BASOPHILS RELATIVE PERCENT: 0.3 % (ref 0–2)
BILIRUB SERPL-MCNC: 0.3 MG/DL (ref 0–1.2)
BILIRUBIN URINE: NEGATIVE
BLOOD, URINE: NEGATIVE
BUN BLDV-MCNC: 69 MG/DL (ref 8–23)
CALCIUM SERPL-MCNC: 8.7 MG/DL (ref 8.6–10.2)
CASTS: ABNORMAL /LPF
CHLORIDE BLD-SCNC: 104 MMOL/L (ref 98–107)
CLARITY: CLEAR
CO2: 21 MMOL/L (ref 22–29)
COLOR: YELLOW
CREAT SERPL-MCNC: 2.9 MG/DL (ref 0.7–1.2)
EOSINOPHILS ABSOLUTE: 0.43 E9/L (ref 0.05–0.5)
EOSINOPHILS RELATIVE PERCENT: 5.8 % (ref 0–6)
GFR AFRICAN AMERICAN: 25
GFR NON-AFRICAN AMERICAN: 20 ML/MIN/1.73
GLUCOSE BLD-MCNC: 108 MG/DL (ref 74–99)
GLUCOSE URINE: NEGATIVE MG/DL
HCT VFR BLD CALC: 23.9 % (ref 37–54)
HEMOGLOBIN: 7.6 G/DL (ref 12.5–16.5)
IMMATURE GRANULOCYTES #: 0.04 E9/L
IMMATURE GRANULOCYTES %: 0.5 % (ref 0–5)
INR BLD: 1.1
KETONES, URINE: NEGATIVE MG/DL
LACTIC ACID: 1.4 MMOL/L (ref 0.5–2.2)
LEUKOCYTE ESTERASE, URINE: ABNORMAL
LYMPHOCYTES ABSOLUTE: 0.74 E9/L (ref 1.5–4)
LYMPHOCYTES RELATIVE PERCENT: 10.1 % (ref 20–42)
MAGNESIUM: 2.4 MG/DL (ref 1.6–2.6)
MCH RBC QN AUTO: 29.8 PG (ref 26–35)
MCHC RBC AUTO-ENTMCNC: 31.8 % (ref 32–34.5)
MCV RBC AUTO: 93.7 FL (ref 80–99.9)
MONOCYTES ABSOLUTE: 0.72 E9/L (ref 0.1–0.95)
MONOCYTES RELATIVE PERCENT: 9.8 % (ref 2–12)
NEUTROPHILS ABSOLUTE: 5.41 E9/L (ref 1.8–7.3)
NEUTROPHILS RELATIVE PERCENT: 73.5 % (ref 43–80)
NITRITE, URINE: NEGATIVE
PDW BLD-RTO: 16.4 FL (ref 11.5–15)
PH UA: 5.5 (ref 5–9)
PLATELET # BLD: 195 E9/L (ref 130–450)
PMV BLD AUTO: 11.3 FL (ref 7–12)
POTASSIUM SERPL-SCNC: 4.8 MMOL/L (ref 3.5–5)
POTASSIUM SERPL-SCNC: 6.1 MMOL/L (ref 3.5–5)
PRO-BNP: 7834 PG/ML (ref 0–450)
PROTEIN UA: ABNORMAL MG/DL
PROTHROMBIN TIME: 12.2 SEC (ref 9.3–12.4)
RBC # BLD: 2.55 E12/L (ref 3.8–5.8)
RBC UA: ABNORMAL /HPF (ref 0–2)
SODIUM BLD-SCNC: 138 MMOL/L (ref 132–146)
SPECIFIC GRAVITY UA: 1.01 (ref 1–1.03)
TOTAL PROTEIN: 6.7 G/DL (ref 6.4–8.3)
TROPONIN: 0.1 NG/ML (ref 0–0.03)
UROBILINOGEN, URINE: 0.2 E.U./DL
WBC # BLD: 7.4 E9/L (ref 4.5–11.5)
WBC UA: ABNORMAL /HPF (ref 0–5)

## 2019-02-12 PROCEDURE — 94640 AIRWAY INHALATION TREATMENT: CPT

## 2019-02-12 PROCEDURE — 87633 RESP VIRUS 12-25 TARGETS: CPT

## 2019-02-12 PROCEDURE — 94664 DEMO&/EVAL PT USE INHALER: CPT

## 2019-02-12 PROCEDURE — 2700000000 HC OXYGEN THERAPY PER DAY

## 2019-02-12 PROCEDURE — 80053 COMPREHEN METABOLIC PANEL: CPT

## 2019-02-12 PROCEDURE — 87088 URINE BACTERIA CULTURE: CPT

## 2019-02-12 PROCEDURE — 94760 N-INVAS EAR/PLS OXIMETRY 1: CPT

## 2019-02-12 PROCEDURE — 6360000002 HC RX W HCPCS: Performed by: INTERNAL MEDICINE

## 2019-02-12 PROCEDURE — 87486 CHLMYD PNEUM DNA AMP PROBE: CPT

## 2019-02-12 PROCEDURE — 2580000003 HC RX 258: Performed by: EMERGENCY MEDICINE

## 2019-02-12 PROCEDURE — 81001 URINALYSIS AUTO W/SCOPE: CPT

## 2019-02-12 PROCEDURE — 2580000003 HC RX 258: Performed by: INTERNAL MEDICINE

## 2019-02-12 PROCEDURE — 85025 COMPLETE CBC W/AUTO DIFF WBC: CPT

## 2019-02-12 PROCEDURE — 6360000002 HC RX W HCPCS: Performed by: EMERGENCY MEDICINE

## 2019-02-12 PROCEDURE — 87581 M.PNEUMON DNA AMP PROBE: CPT

## 2019-02-12 PROCEDURE — 6370000000 HC RX 637 (ALT 250 FOR IP): Performed by: EMERGENCY MEDICINE

## 2019-02-12 PROCEDURE — 87040 BLOOD CULTURE FOR BACTERIA: CPT

## 2019-02-12 PROCEDURE — 83735 ASSAY OF MAGNESIUM: CPT

## 2019-02-12 PROCEDURE — 6370000000 HC RX 637 (ALT 250 FOR IP): Performed by: INTERNAL MEDICINE

## 2019-02-12 PROCEDURE — 71045 X-RAY EXAM CHEST 1 VIEW: CPT

## 2019-02-12 PROCEDURE — APPSS60 APP SPLIT SHARED TIME 46-60 MINUTES: Performed by: NURSE PRACTITIONER

## 2019-02-12 PROCEDURE — 87798 DETECT AGENT NOS DNA AMP: CPT

## 2019-02-12 PROCEDURE — 84132 ASSAY OF SERUM POTASSIUM: CPT

## 2019-02-12 PROCEDURE — 2060000000 HC ICU INTERMEDIATE R&B

## 2019-02-12 PROCEDURE — 2500000003 HC RX 250 WO HCPCS: Performed by: INTERNAL MEDICINE

## 2019-02-12 PROCEDURE — 83880 ASSAY OF NATRIURETIC PEPTIDE: CPT

## 2019-02-12 PROCEDURE — 36415 COLL VENOUS BLD VENIPUNCTURE: CPT

## 2019-02-12 PROCEDURE — 99285 EMERGENCY DEPT VISIT HI MDM: CPT

## 2019-02-12 PROCEDURE — 99222 1ST HOSP IP/OBS MODERATE 55: CPT | Performed by: INTERNAL MEDICINE

## 2019-02-12 PROCEDURE — 84484 ASSAY OF TROPONIN QUANT: CPT

## 2019-02-12 PROCEDURE — 83605 ASSAY OF LACTIC ACID: CPT

## 2019-02-12 PROCEDURE — 85610 PROTHROMBIN TIME: CPT

## 2019-02-12 PROCEDURE — 96374 THER/PROPH/DIAG INJ IV PUSH: CPT

## 2019-02-12 RX ORDER — SODIUM CHLORIDE 0.9 % (FLUSH) 0.9 %
10 SYRINGE (ML) INJECTION PRN
Status: DISCONTINUED | OUTPATIENT
Start: 2019-02-12 | End: 2019-02-12 | Stop reason: SDUPTHER

## 2019-02-12 RX ORDER — FUROSEMIDE 10 MG/ML
20 INJECTION INTRAMUSCULAR; INTRAVENOUS ONCE
Status: COMPLETED | OUTPATIENT
Start: 2019-02-12 | End: 2019-02-12

## 2019-02-12 RX ORDER — CHOLECALCIFEROL (VITAMIN D3) 50 MCG
2000 TABLET ORAL EVERY MORNING
Status: DISCONTINUED | OUTPATIENT
Start: 2019-02-13 | End: 2019-02-15 | Stop reason: HOSPADM

## 2019-02-12 RX ORDER — BISACODYL 10 MG
10 SUPPOSITORY, RECTAL RECTAL DAILY PRN
COMMUNITY

## 2019-02-12 RX ORDER — IPRATROPIUM BROMIDE AND ALBUTEROL SULFATE 2.5; .5 MG/3ML; MG/3ML
1 SOLUTION RESPIRATORY (INHALATION) EVERY 6 HOURS PRN
Status: DISCONTINUED | OUTPATIENT
Start: 2019-02-12 | End: 2019-02-15 | Stop reason: HOSPADM

## 2019-02-12 RX ORDER — HYDRALAZINE HYDROCHLORIDE 50 MG/1
100 TABLET, FILM COATED ORAL 3 TIMES DAILY
Status: DISCONTINUED | OUTPATIENT
Start: 2019-02-12 | End: 2019-02-15 | Stop reason: HOSPADM

## 2019-02-12 RX ORDER — HEPARIN SODIUM 10000 [USP'U]/ML
5000 INJECTION, SOLUTION INTRAVENOUS; SUBCUTANEOUS EVERY 12 HOURS
Status: DISCONTINUED | OUTPATIENT
Start: 2019-02-12 | End: 2019-02-15 | Stop reason: HOSPADM

## 2019-02-12 RX ORDER — SODIUM CHLORIDE 0.9 % (FLUSH) 0.9 %
10 SYRINGE (ML) INJECTION PRN
Status: DISCONTINUED | OUTPATIENT
Start: 2019-02-12 | End: 2019-02-15 | Stop reason: HOSPADM

## 2019-02-12 RX ORDER — IPRATROPIUM BROMIDE AND ALBUTEROL SULFATE 2.5; .5 MG/3ML; MG/3ML
1 SOLUTION RESPIRATORY (INHALATION) EVERY 6 HOURS PRN
Status: ON HOLD | COMMUNITY
Start: 2019-02-09 | End: 2019-02-15 | Stop reason: HOSPADM

## 2019-02-12 RX ORDER — ACETAMINOPHEN 325 MG/1
650 TABLET ORAL EVERY 4 HOURS PRN
Status: DISCONTINUED | OUTPATIENT
Start: 2019-02-12 | End: 2019-02-15 | Stop reason: HOSPADM

## 2019-02-12 RX ORDER — IPRATROPIUM BROMIDE AND ALBUTEROL SULFATE 2.5; .5 MG/3ML; MG/3ML
3 SOLUTION RESPIRATORY (INHALATION) ONCE
Status: COMPLETED | OUTPATIENT
Start: 2019-02-12 | End: 2019-02-12

## 2019-02-12 RX ORDER — ISOSORBIDE MONONITRATE 30 MG/1
30 TABLET, EXTENDED RELEASE ORAL DAILY
Status: DISCONTINUED | OUTPATIENT
Start: 2019-02-13 | End: 2019-02-15 | Stop reason: HOSPADM

## 2019-02-12 RX ORDER — ASPIRIN 81 MG/1
81 TABLET, CHEWABLE ORAL DAILY
Status: DISCONTINUED | OUTPATIENT
Start: 2019-02-13 | End: 2019-02-15 | Stop reason: HOSPADM

## 2019-02-12 RX ORDER — AMLODIPINE BESYLATE 2.5 MG/1
2.5 TABLET ORAL NIGHTLY
Status: DISCONTINUED | OUTPATIENT
Start: 2019-02-12 | End: 2019-02-15 | Stop reason: HOSPADM

## 2019-02-12 RX ORDER — M-VIT,TX,IRON,MINS/CALC/FOLIC 27MG-0.4MG
1 TABLET ORAL EVERY MORNING
COMMUNITY

## 2019-02-12 RX ORDER — FUROSEMIDE 10 MG/ML
40 INJECTION INTRAMUSCULAR; INTRAVENOUS 2 TIMES DAILY
Status: DISCONTINUED | OUTPATIENT
Start: 2019-02-12 | End: 2019-02-14

## 2019-02-12 RX ORDER — SODIUM CHLORIDE 0.9 % (FLUSH) 0.9 %
10 SYRINGE (ML) INJECTION EVERY 12 HOURS SCHEDULED
Status: DISCONTINUED | OUTPATIENT
Start: 2019-02-12 | End: 2019-02-12 | Stop reason: SDUPTHER

## 2019-02-12 RX ORDER — 0.9 % SODIUM CHLORIDE 0.9 %
500 INTRAVENOUS SOLUTION INTRAVENOUS ONCE
Status: COMPLETED | OUTPATIENT
Start: 2019-02-12 | End: 2019-02-12

## 2019-02-12 RX ORDER — IPRATROPIUM BROMIDE AND ALBUTEROL SULFATE 2.5; .5 MG/3ML; MG/3ML
1 SOLUTION RESPIRATORY (INHALATION) EVERY 6 HOURS
Status: ON HOLD | COMMUNITY
Start: 2019-02-09 | End: 2019-02-15 | Stop reason: HOSPADM

## 2019-02-12 RX ORDER — ALLOPURINOL 100 MG/1
100 TABLET ORAL DAILY
Status: DISCONTINUED | OUTPATIENT
Start: 2019-02-12 | End: 2019-02-15 | Stop reason: HOSPADM

## 2019-02-12 RX ORDER — ONDANSETRON 2 MG/ML
4 INJECTION INTRAMUSCULAR; INTRAVENOUS EVERY 6 HOURS PRN
Status: DISCONTINUED | OUTPATIENT
Start: 2019-02-12 | End: 2019-02-12

## 2019-02-12 RX ORDER — ASPIRIN 81 MG/1
324 TABLET, CHEWABLE ORAL ONCE
Status: COMPLETED | OUTPATIENT
Start: 2019-02-12 | End: 2019-02-12

## 2019-02-12 RX ORDER — AMMONIUM LACTATE 12 G/100G
LOTION TOPICAL 2 TIMES DAILY
Status: DISCONTINUED | OUTPATIENT
Start: 2019-02-12 | End: 2019-02-15 | Stop reason: HOSPADM

## 2019-02-12 RX ORDER — SODIUM CHLORIDE 0.9 % (FLUSH) 0.9 %
10 SYRINGE (ML) INJECTION EVERY 12 HOURS SCHEDULED
Status: DISCONTINUED | OUTPATIENT
Start: 2019-02-12 | End: 2019-02-15 | Stop reason: HOSPADM

## 2019-02-12 RX ADMIN — FUROSEMIDE 40 MG: 10 INJECTION, SOLUTION INTRAMUSCULAR; INTRAVENOUS at 21:35

## 2019-02-12 RX ADMIN — Medication: at 21:44

## 2019-02-12 RX ADMIN — SODIUM CHLORIDE 500 ML: 9 INJECTION, SOLUTION INTRAVENOUS at 12:24

## 2019-02-12 RX ADMIN — HYDRALAZINE HYDROCHLORIDE 100 MG: 50 TABLET ORAL at 21:36

## 2019-02-12 RX ADMIN — FUROSEMIDE 20 MG: 10 INJECTION, SOLUTION INTRAVENOUS at 13:15

## 2019-02-12 RX ADMIN — MICONAZOLE NITRATE: 20 POWDER TOPICAL at 21:44

## 2019-02-12 RX ADMIN — Medication 10 ML: at 21:35

## 2019-02-12 RX ADMIN — TRIAMCINOLONE ACETONIDE: 1 OINTMENT TOPICAL at 21:44

## 2019-02-12 RX ADMIN — AMLODIPINE BESYLATE 2.5 MG: 2.5 TABLET ORAL at 21:38

## 2019-02-12 RX ADMIN — ASPIRIN 81 MG 324 MG: 81 TABLET ORAL at 13:15

## 2019-02-12 RX ADMIN — HEPARIN SODIUM 5000 UNITS: 10000 INJECTION INTRAVENOUS; SUBCUTANEOUS at 17:29

## 2019-02-12 RX ADMIN — IPRATROPIUM BROMIDE AND ALBUTEROL SULFATE 3 AMPULE: .5; 3 SOLUTION RESPIRATORY (INHALATION) at 12:25

## 2019-02-12 RX ADMIN — HYDRALAZINE HYDROCHLORIDE 100 MG: 50 TABLET ORAL at 17:29

## 2019-02-12 ASSESSMENT — PAIN SCALES - GENERAL
PAINLEVEL_OUTOF10: 0
PAINLEVEL_OUTOF10: 0

## 2019-02-13 LAB
ALBUMIN SERPL-MCNC: 3.1 G/DL (ref 3.5–5.2)
ALP BLD-CCNC: 84 U/L (ref 40–129)
ALT SERPL-CCNC: 13 U/L (ref 0–40)
ANION GAP SERPL CALCULATED.3IONS-SCNC: 13 MMOL/L (ref 7–16)
AST SERPL-CCNC: 21 U/L (ref 0–39)
BASOPHILS ABSOLUTE: 0.03 E9/L (ref 0–0.2)
BASOPHILS RELATIVE PERCENT: 0.5 % (ref 0–2)
BILIRUB SERPL-MCNC: 0.3 MG/DL (ref 0–1.2)
BUN BLDV-MCNC: 67 MG/DL (ref 8–23)
CALCIUM SERPL-MCNC: 8.6 MG/DL (ref 8.6–10.2)
CHLORIDE BLD-SCNC: 106 MMOL/L (ref 98–107)
CO2: 20 MMOL/L (ref 22–29)
CREAT SERPL-MCNC: 2.8 MG/DL (ref 0.7–1.2)
EOSINOPHILS ABSOLUTE: 0.84 E9/L (ref 0.05–0.5)
EOSINOPHILS RELATIVE PERCENT: 13.5 % (ref 0–6)
FILM ARRAY ADENOVIRUS: ABNORMAL
FILM ARRAY BORDETELLA PERTUSSIS: ABNORMAL
FILM ARRAY CHLAMYDOPHILIA PNEUMONIAE: ABNORMAL
FILM ARRAY CORONAVIRUS 229E: ABNORMAL
FILM ARRAY CORONAVIRUS HKU1: ABNORMAL
FILM ARRAY CORONAVIRUS NL63: ABNORMAL
FILM ARRAY CORONAVIRUS OC43: ABNORMAL
FILM ARRAY INFLUENZA A VIRUS 09H1: ABNORMAL
FILM ARRAY INFLUENZA A VIRUS H1: ABNORMAL
FILM ARRAY INFLUENZA A VIRUS H3: ABNORMAL
FILM ARRAY INFLUENZA A VIRUS: ABNORMAL
FILM ARRAY INFLUENZA B: ABNORMAL
FILM ARRAY METAPNEUMOVIRUS: ABNORMAL
FILM ARRAY MYCOPLASMA PNEUMONIAE: ABNORMAL
FILM ARRAY PARAINFLUENZA VIRUS 1: ABNORMAL
FILM ARRAY PARAINFLUENZA VIRUS 2: ABNORMAL
FILM ARRAY PARAINFLUENZA VIRUS 3: ABNORMAL
FILM ARRAY PARAINFLUENZA VIRUS 4: ABNORMAL
FILM ARRAY RESPIRATORY SYNCITIAL VIRUS: ABNORMAL
GFR AFRICAN AMERICAN: 26
GFR NON-AFRICAN AMERICAN: 21 ML/MIN/1.73
GLUCOSE BLD-MCNC: 85 MG/DL (ref 74–99)
HCT VFR BLD CALC: 23.1 % (ref 37–54)
HEMOGLOBIN: 7.3 G/DL (ref 12.5–16.5)
IMMATURE GRANULOCYTES #: 0.01 E9/L
IMMATURE GRANULOCYTES %: 0.2 % (ref 0–5)
LYMPHOCYTES ABSOLUTE: 0.94 E9/L (ref 1.5–4)
LYMPHOCYTES RELATIVE PERCENT: 15.1 % (ref 20–42)
MAGNESIUM: 2.3 MG/DL (ref 1.6–2.6)
MCH RBC QN AUTO: 29.7 PG (ref 26–35)
MCHC RBC AUTO-ENTMCNC: 31.6 % (ref 32–34.5)
MCV RBC AUTO: 93.9 FL (ref 80–99.9)
MONOCYTES ABSOLUTE: 0.62 E9/L (ref 0.1–0.95)
MONOCYTES RELATIVE PERCENT: 9.9 % (ref 2–12)
NEUTROPHILS ABSOLUTE: 3.8 E9/L (ref 1.8–7.3)
NEUTROPHILS RELATIVE PERCENT: 60.8 % (ref 43–80)
ORGANISM: ABNORMAL
PDW BLD-RTO: 16.3 FL (ref 11.5–15)
PHOSPHORUS: 4.6 MG/DL (ref 2.5–4.5)
PLATELET # BLD: 169 E9/L (ref 130–450)
PMV BLD AUTO: 11.1 FL (ref 7–12)
POTASSIUM SERPL-SCNC: 4.9 MMOL/L (ref 3.5–5)
RBC # BLD: 2.46 E12/L (ref 3.8–5.8)
SODIUM BLD-SCNC: 139 MMOL/L (ref 132–146)
TOTAL PROTEIN: 6.2 G/DL (ref 6.4–8.3)
URINE CULTURE, ROUTINE: NORMAL
WBC # BLD: 6.2 E9/L (ref 4.5–11.5)

## 2019-02-13 PROCEDURE — 36415 COLL VENOUS BLD VENIPUNCTURE: CPT

## 2019-02-13 PROCEDURE — 2060000000 HC ICU INTERMEDIATE R&B

## 2019-02-13 PROCEDURE — 97165 OT EVAL LOW COMPLEX 30 MIN: CPT

## 2019-02-13 PROCEDURE — 99232 SBSQ HOSP IP/OBS MODERATE 35: CPT | Performed by: INTERNAL MEDICINE

## 2019-02-13 PROCEDURE — 2500000003 HC RX 250 WO HCPCS: Performed by: INTERNAL MEDICINE

## 2019-02-13 PROCEDURE — 94640 AIRWAY INHALATION TREATMENT: CPT

## 2019-02-13 PROCEDURE — 97162 PT EVAL MOD COMPLEX 30 MIN: CPT

## 2019-02-13 PROCEDURE — 6370000000 HC RX 637 (ALT 250 FOR IP): Performed by: INTERNAL MEDICINE

## 2019-02-13 PROCEDURE — 85025 COMPLETE CBC W/AUTO DIFF WBC: CPT

## 2019-02-13 PROCEDURE — 84100 ASSAY OF PHOSPHORUS: CPT

## 2019-02-13 PROCEDURE — 2580000003 HC RX 258: Performed by: INTERNAL MEDICINE

## 2019-02-13 PROCEDURE — 6360000002 HC RX W HCPCS: Performed by: INTERNAL MEDICINE

## 2019-02-13 PROCEDURE — 2700000000 HC OXYGEN THERAPY PER DAY

## 2019-02-13 PROCEDURE — 80053 COMPREHEN METABOLIC PANEL: CPT

## 2019-02-13 PROCEDURE — 83735 ASSAY OF MAGNESIUM: CPT

## 2019-02-13 RX ORDER — ASPIRIN 81 MG/1
TABLET, CHEWABLE ORAL
Status: DISPENSED
Start: 2019-02-13 | End: 2019-02-13

## 2019-02-13 RX ORDER — ISOSORBIDE MONONITRATE 30 MG/1
TABLET, EXTENDED RELEASE ORAL
Status: DISPENSED
Start: 2019-02-13 | End: 2019-02-13

## 2019-02-13 RX ADMIN — ISOSORBIDE MONONITRATE 30 MG: 30 TABLET, EXTENDED RELEASE ORAL at 09:24

## 2019-02-13 RX ADMIN — HEPARIN SODIUM 5000 UNITS: 10000 INJECTION INTRAVENOUS; SUBCUTANEOUS at 17:12

## 2019-02-13 RX ADMIN — HYDRALAZINE HYDROCHLORIDE 100 MG: 50 TABLET ORAL at 15:50

## 2019-02-13 RX ADMIN — SODIUM CHLORIDE 25 MG: 9 INJECTION, SOLUTION INTRAVENOUS at 09:11

## 2019-02-13 RX ADMIN — HEPARIN SODIUM 5000 UNITS: 10000 INJECTION INTRAVENOUS; SUBCUTANEOUS at 04:20

## 2019-02-13 RX ADMIN — TRIAMCINOLONE ACETONIDE: 1 OINTMENT TOPICAL at 09:11

## 2019-02-13 RX ADMIN — IPRATROPIUM BROMIDE AND ALBUTEROL SULFATE 3 ML: .5; 3 SOLUTION RESPIRATORY (INHALATION) at 23:50

## 2019-02-13 RX ADMIN — SODIUM CHLORIDE 100 MG: 9 INJECTION, SOLUTION INTRAVENOUS at 11:29

## 2019-02-13 RX ADMIN — HYDRALAZINE HYDROCHLORIDE 100 MG: 50 TABLET ORAL at 21:28

## 2019-02-13 RX ADMIN — DARBEPOETIN ALFA 60 MCG: 60 INJECTION, SOLUTION INTRAVENOUS; SUBCUTANEOUS at 15:51

## 2019-02-13 RX ADMIN — TRIAMCINOLONE ACETONIDE: 1 OINTMENT TOPICAL at 21:28

## 2019-02-13 RX ADMIN — ASPIRIN 81 MG 81 MG: 81 TABLET ORAL at 09:23

## 2019-02-13 RX ADMIN — CHOLECALCIFEROL TAB 50 MCG (2000 UNIT) 2000 UNITS: 50 TAB at 09:11

## 2019-02-13 RX ADMIN — Medication 10 ML: at 09:11

## 2019-02-13 RX ADMIN — ALLOPURINOL 100 MG: 100 TABLET ORAL at 09:14

## 2019-02-13 RX ADMIN — Medication: at 09:10

## 2019-02-13 RX ADMIN — Medication: at 21:28

## 2019-02-13 RX ADMIN — FUROSEMIDE 40 MG: 10 INJECTION, SOLUTION INTRAMUSCULAR; INTRAVENOUS at 17:11

## 2019-02-13 RX ADMIN — FUROSEMIDE 40 MG: 10 INJECTION, SOLUTION INTRAMUSCULAR; INTRAVENOUS at 09:14

## 2019-02-13 RX ADMIN — AMLODIPINE BESYLATE 2.5 MG: 2.5 TABLET ORAL at 21:28

## 2019-02-13 RX ADMIN — Medication 10 ML: at 21:29

## 2019-02-13 RX ADMIN — HYDRALAZINE HYDROCHLORIDE 100 MG: 50 TABLET ORAL at 09:14

## 2019-02-13 RX ADMIN — MICONAZOLE NITRATE: 20 POWDER TOPICAL at 21:28

## 2019-02-13 ASSESSMENT — PAIN SCALES - GENERAL
PAINLEVEL_OUTOF10: 0

## 2019-02-14 LAB
ALBUMIN SERPL-MCNC: 3.2 G/DL (ref 3.5–5.2)
ALP BLD-CCNC: 88 U/L (ref 40–129)
ALT SERPL-CCNC: 14 U/L (ref 0–40)
ANION GAP SERPL CALCULATED.3IONS-SCNC: 13 MMOL/L (ref 7–16)
AST SERPL-CCNC: 20 U/L (ref 0–39)
BILIRUB SERPL-MCNC: 0.4 MG/DL (ref 0–1.2)
BUN BLDV-MCNC: 72 MG/DL (ref 8–23)
CALCIUM SERPL-MCNC: 8.7 MG/DL (ref 8.6–10.2)
CHLORIDE BLD-SCNC: 105 MMOL/L (ref 98–107)
CO2: 20 MMOL/L (ref 22–29)
CREAT SERPL-MCNC: 2.8 MG/DL (ref 0.7–1.2)
GFR AFRICAN AMERICAN: 26
GFR NON-AFRICAN AMERICAN: 21 ML/MIN/1.73
GLUCOSE BLD-MCNC: 87 MG/DL (ref 74–99)
HCT VFR BLD CALC: 24.3 % (ref 37–54)
HEMOGLOBIN: 7.8 G/DL (ref 12.5–16.5)
MAGNESIUM: 2.2 MG/DL (ref 1.6–2.6)
MCH RBC QN AUTO: 30 PG (ref 26–35)
MCHC RBC AUTO-ENTMCNC: 32.1 % (ref 32–34.5)
MCV RBC AUTO: 93.5 FL (ref 80–99.9)
PDW BLD-RTO: 16.1 FL (ref 11.5–15)
PHOSPHORUS: 4.7 MG/DL (ref 2.5–4.5)
PLATELET # BLD: 174 E9/L (ref 130–450)
PMV BLD AUTO: 10.5 FL (ref 7–12)
POTASSIUM SERPL-SCNC: 4.5 MMOL/L (ref 3.5–5)
RBC # BLD: 2.6 E12/L (ref 3.8–5.8)
SODIUM BLD-SCNC: 138 MMOL/L (ref 132–146)
TOTAL PROTEIN: 6.3 G/DL (ref 6.4–8.3)
WBC # BLD: 8 E9/L (ref 4.5–11.5)

## 2019-02-14 PROCEDURE — 6370000000 HC RX 637 (ALT 250 FOR IP): Performed by: INTERNAL MEDICINE

## 2019-02-14 PROCEDURE — 94640 AIRWAY INHALATION TREATMENT: CPT

## 2019-02-14 PROCEDURE — 97530 THERAPEUTIC ACTIVITIES: CPT

## 2019-02-14 PROCEDURE — 99232 SBSQ HOSP IP/OBS MODERATE 35: CPT | Performed by: INTERNAL MEDICINE

## 2019-02-14 PROCEDURE — 85027 COMPLETE CBC AUTOMATED: CPT

## 2019-02-14 PROCEDURE — 84100 ASSAY OF PHOSPHORUS: CPT

## 2019-02-14 PROCEDURE — 2580000003 HC RX 258: Performed by: INTERNAL MEDICINE

## 2019-02-14 PROCEDURE — 6360000002 HC RX W HCPCS: Performed by: INTERNAL MEDICINE

## 2019-02-14 PROCEDURE — 2700000000 HC OXYGEN THERAPY PER DAY

## 2019-02-14 PROCEDURE — 36415 COLL VENOUS BLD VENIPUNCTURE: CPT

## 2019-02-14 PROCEDURE — 80053 COMPREHEN METABOLIC PANEL: CPT

## 2019-02-14 PROCEDURE — 2060000000 HC ICU INTERMEDIATE R&B

## 2019-02-14 PROCEDURE — 83735 ASSAY OF MAGNESIUM: CPT

## 2019-02-14 PROCEDURE — 2500000003 HC RX 250 WO HCPCS: Performed by: INTERNAL MEDICINE

## 2019-02-14 RX ORDER — FUROSEMIDE 40 MG/1
40 TABLET ORAL 2 TIMES DAILY
Status: DISCONTINUED | OUTPATIENT
Start: 2019-02-15 | End: 2019-02-15 | Stop reason: HOSPADM

## 2019-02-14 RX ORDER — FUROSEMIDE 10 MG/ML
40 INJECTION INTRAMUSCULAR; INTRAVENOUS 2 TIMES DAILY
Status: COMPLETED | OUTPATIENT
Start: 2019-02-14 | End: 2019-02-14

## 2019-02-14 RX ADMIN — HYDRALAZINE HYDROCHLORIDE 100 MG: 50 TABLET ORAL at 08:49

## 2019-02-14 RX ADMIN — TRIAMCINOLONE ACETONIDE: 1 OINTMENT TOPICAL at 21:04

## 2019-02-14 RX ADMIN — IPRATROPIUM BROMIDE AND ALBUTEROL SULFATE 3 ML: .5; 3 SOLUTION RESPIRATORY (INHALATION) at 21:05

## 2019-02-14 RX ADMIN — Medication 10 ML: at 08:50

## 2019-02-14 RX ADMIN — Medication: at 08:48

## 2019-02-14 RX ADMIN — FUROSEMIDE 40 MG: 10 INJECTION, SOLUTION INTRAMUSCULAR; INTRAVENOUS at 08:48

## 2019-02-14 RX ADMIN — ASPIRIN 81 MG 81 MG: 81 TABLET ORAL at 08:49

## 2019-02-14 RX ADMIN — SODIUM CHLORIDE 125 MG: 900 INJECTION INTRAVENOUS at 08:48

## 2019-02-14 RX ADMIN — AMLODIPINE BESYLATE 2.5 MG: 2.5 TABLET ORAL at 21:03

## 2019-02-14 RX ADMIN — FUROSEMIDE 40 MG: 10 INJECTION, SOLUTION INTRAMUSCULAR; INTRAVENOUS at 16:45

## 2019-02-14 RX ADMIN — ISOSORBIDE MONONITRATE 30 MG: 30 TABLET, EXTENDED RELEASE ORAL at 08:50

## 2019-02-14 RX ADMIN — HYDRALAZINE HYDROCHLORIDE 100 MG: 50 TABLET ORAL at 21:03

## 2019-02-14 RX ADMIN — HEPARIN SODIUM 5000 UNITS: 10000 INJECTION INTRAVENOUS; SUBCUTANEOUS at 16:44

## 2019-02-14 RX ADMIN — Medication: at 21:04

## 2019-02-14 RX ADMIN — TRIAMCINOLONE ACETONIDE: 1 OINTMENT TOPICAL at 08:48

## 2019-02-14 RX ADMIN — Medication 10 ML: at 21:03

## 2019-02-14 RX ADMIN — ALLOPURINOL 100 MG: 100 TABLET ORAL at 08:49

## 2019-02-14 RX ADMIN — CHOLECALCIFEROL TAB 50 MCG (2000 UNIT) 2000 UNITS: 50 TAB at 08:49

## 2019-02-14 RX ADMIN — HYDRALAZINE HYDROCHLORIDE 100 MG: 50 TABLET ORAL at 14:34

## 2019-02-14 RX ADMIN — MICONAZOLE NITRATE: 20 POWDER TOPICAL at 16:43

## 2019-02-14 RX ADMIN — HEPARIN SODIUM 5000 UNITS: 10000 INJECTION INTRAVENOUS; SUBCUTANEOUS at 04:31

## 2019-02-14 ASSESSMENT — PAIN SCALES - GENERAL
PAINLEVEL_OUTOF10: 0
PAINLEVEL_OUTOF10: 0

## 2019-02-15 VITALS
RESPIRATION RATE: 22 BRPM | DIASTOLIC BLOOD PRESSURE: 54 MMHG | SYSTOLIC BLOOD PRESSURE: 132 MMHG | OXYGEN SATURATION: 90 % | HEIGHT: 69 IN | TEMPERATURE: 99.1 F | WEIGHT: 205.1 LBS | BODY MASS INDEX: 30.38 KG/M2 | HEART RATE: 66 BPM

## 2019-02-15 LAB
ALBUMIN SERPL-MCNC: 3 G/DL (ref 3.5–5.2)
ALP BLD-CCNC: 86 U/L (ref 40–129)
ALT SERPL-CCNC: 11 U/L (ref 0–40)
ANION GAP SERPL CALCULATED.3IONS-SCNC: 15 MMOL/L (ref 7–16)
AST SERPL-CCNC: 19 U/L (ref 0–39)
BILIRUB SERPL-MCNC: 0.4 MG/DL (ref 0–1.2)
BUN BLDV-MCNC: 68 MG/DL (ref 8–23)
CALCIUM SERPL-MCNC: 8.5 MG/DL (ref 8.6–10.2)
CHLORIDE BLD-SCNC: 101 MMOL/L (ref 98–107)
CO2: 20 MMOL/L (ref 22–29)
CREAT SERPL-MCNC: 2.7 MG/DL (ref 0.7–1.2)
GFR AFRICAN AMERICAN: 27
GFR NON-AFRICAN AMERICAN: 22 ML/MIN/1.73
GLUCOSE BLD-MCNC: 96 MG/DL (ref 74–99)
HCT VFR BLD CALC: 25 % (ref 37–54)
HEMOGLOBIN: 8 G/DL (ref 12.5–16.5)
MAGNESIUM: 2.1 MG/DL (ref 1.6–2.6)
MCH RBC QN AUTO: 29.9 PG (ref 26–35)
MCHC RBC AUTO-ENTMCNC: 32 % (ref 32–34.5)
MCV RBC AUTO: 93.3 FL (ref 80–99.9)
PDW BLD-RTO: 15.9 FL (ref 11.5–15)
PHOSPHORUS: 4.2 MG/DL (ref 2.5–4.5)
PLATELET # BLD: 161 E9/L (ref 130–450)
PMV BLD AUTO: 11.2 FL (ref 7–12)
POTASSIUM SERPL-SCNC: 4.1 MMOL/L (ref 3.5–5)
RBC # BLD: 2.68 E12/L (ref 3.8–5.8)
SODIUM BLD-SCNC: 136 MMOL/L (ref 132–146)
TOTAL PROTEIN: 6.1 G/DL (ref 6.4–8.3)
WBC # BLD: 10.1 E9/L (ref 4.5–11.5)

## 2019-02-15 PROCEDURE — 97535 SELF CARE MNGMENT TRAINING: CPT

## 2019-02-15 PROCEDURE — 6370000000 HC RX 637 (ALT 250 FOR IP): Performed by: INTERNAL MEDICINE

## 2019-02-15 PROCEDURE — 85027 COMPLETE CBC AUTOMATED: CPT

## 2019-02-15 PROCEDURE — 6360000002 HC RX W HCPCS: Performed by: INTERNAL MEDICINE

## 2019-02-15 PROCEDURE — 83735 ASSAY OF MAGNESIUM: CPT

## 2019-02-15 PROCEDURE — 2580000003 HC RX 258: Performed by: INTERNAL MEDICINE

## 2019-02-15 PROCEDURE — 80053 COMPREHEN METABOLIC PANEL: CPT

## 2019-02-15 PROCEDURE — 36415 COLL VENOUS BLD VENIPUNCTURE: CPT

## 2019-02-15 PROCEDURE — 84100 ASSAY OF PHOSPHORUS: CPT

## 2019-02-15 RX ORDER — ASPIRIN 81 MG/1
81 TABLET, CHEWABLE ORAL DAILY
Qty: 30 TABLET | Refills: 3 | Status: SHIPPED | OUTPATIENT
Start: 2019-02-15

## 2019-02-15 RX ORDER — FUROSEMIDE 40 MG/1
40 TABLET ORAL 2 TIMES DAILY
Qty: 60 TABLET | Refills: 3 | Status: SHIPPED | OUTPATIENT
Start: 2019-02-15

## 2019-02-15 RX ADMIN — HEPARIN SODIUM 5000 UNITS: 10000 INJECTION INTRAVENOUS; SUBCUTANEOUS at 04:02

## 2019-02-15 RX ADMIN — HYDRALAZINE HYDROCHLORIDE 100 MG: 50 TABLET ORAL at 10:30

## 2019-02-15 RX ADMIN — ASPIRIN 81 MG 81 MG: 81 TABLET ORAL at 11:07

## 2019-02-15 RX ADMIN — FUROSEMIDE 40 MG: 40 TABLET ORAL at 10:30

## 2019-02-15 RX ADMIN — ISOSORBIDE MONONITRATE 30 MG: 30 TABLET, EXTENDED RELEASE ORAL at 10:30

## 2019-02-15 RX ADMIN — Medication: at 11:04

## 2019-02-15 RX ADMIN — ALLOPURINOL 100 MG: 100 TABLET ORAL at 11:07

## 2019-02-15 RX ADMIN — CHOLECALCIFEROL TAB 50 MCG (2000 UNIT) 2000 UNITS: 50 TAB at 10:30

## 2019-02-15 RX ADMIN — SODIUM CHLORIDE 125 MG: 900 INJECTION INTRAVENOUS at 10:30

## 2019-02-15 RX ADMIN — TRIAMCINOLONE ACETONIDE: 1 OINTMENT TOPICAL at 11:05

## 2019-02-15 RX ADMIN — Medication 10 ML: at 10:31

## 2019-02-15 ASSESSMENT — PAIN SCALES - GENERAL
PAINLEVEL_OUTOF10: 0

## 2019-02-17 LAB
BLOOD CULTURE, ROUTINE: NORMAL
CULTURE, BLOOD 2: NORMAL

## 2019-02-22 ENCOUNTER — TELEPHONE (OUTPATIENT)
Dept: CARDIOLOGY CLINIC | Age: 84
End: 2019-02-22

## 2019-02-24 LAB
EKG ATRIAL RATE: 77 BPM
EKG ATRIAL RATE: 79 BPM
EKG Q-T INTERVAL: 448 MS
EKG Q-T INTERVAL: 472 MS
EKG QRS DURATION: 134 MS
EKG QRS DURATION: 142 MS
EKG QTC CALCULATION (BAZETT): 513 MS
EKG QTC CALCULATION (BAZETT): 523 MS
EKG R AXIS: 2 DEGREES
EKG R AXIS: 2 DEGREES
EKG T AXIS: 22 DEGREES
EKG T AXIS: 31 DEGREES
EKG VENTRICULAR RATE: 74 BPM
EKG VENTRICULAR RATE: 79 BPM

## 2022-02-02 NOTE — PROGRESS NOTES
Left Crystal a detailed message that Dr. Caldwell doesn't have any additional questions and that she should continue to monitor Dee and if her symptoms get worse than she needs to be seen.     mobility. Sit balance: fair  Stand balance: fair minus with FWW  Endurance/Activity tolerance: poor. Limited by increased SOB and fatigue with minimal exertion. Sensation: dulled B LEs  Edema: B LEs weeping                            Eval Complexity: Mod   Profile and History- expanded   Assessment of Occupational Performance and Identification of Deficits- steps to enter home, tub/shower combo, B LE edema, wife unable to physically assist pt, SOB with minimal exertion  Clinical Decision Making- Mod      Assessment of current deficits    Functional mobility [x]  ROM [] Strength [x]  Cognition []  ADLs [x]   IADLs [] Safety Awareness [x] Endurance [x]  Fine Motor Coordination [] Balance [x] Vision/perception [] Sensation []   Gross Motor Coordination []       Treatment frequency: 2-5x/wk    Plan of Care:   ADL retraining [x]   Equipment needs [x]   Neuromuscular re-education [] Energy Conservation Techniques [x]  Functional Transfer training [x] Patient and/or Family Education [x]  Functional Mobility training [x]  Environmental Modifications []  Cognitive re-training []   Compensatory techniques for ADLs [x]  Splinting Needs []                             Therapeutic Activities [x]  Positioning/joint protection []             Therapeutic Strengthening  [x]      Other: []      Rehab Potential: Good for stated goals    Patient / Family Goal: To return home    Short term goals  Time Frame: 1-2 weeks    1. Increase LE Dressing/Bathing and Toileting to SBA  2. Increase Functional Transfers to Supervision  3. Increase Functional Mobility to Supervision during all self care tasks  4. Increase standing tolerance to 3-4 min with fair balance maintaining O2 saturation >90% during self care tasks      Patient and/or family understands diagnosis, prognosis and plan of care: Patient educated on OT POC/Established Goals. Patient and wife verbalized good understanding.     Treatment Provided:  Patient educated on adapted